# Patient Record
Sex: FEMALE | Race: WHITE | HISPANIC OR LATINO
[De-identification: names, ages, dates, MRNs, and addresses within clinical notes are randomized per-mention and may not be internally consistent; named-entity substitution may affect disease eponyms.]

---

## 2020-09-24 ENCOUNTER — TRANSCRIPTION ENCOUNTER (OUTPATIENT)
Age: 50
End: 2020-09-24

## 2021-11-13 ENCOUNTER — TRANSCRIPTION ENCOUNTER (OUTPATIENT)
Age: 51
End: 2021-11-13

## 2022-09-07 PROBLEM — Z00.00 ENCOUNTER FOR PREVENTIVE HEALTH EXAMINATION: Status: ACTIVE | Noted: 2022-09-07

## 2022-09-08 ENCOUNTER — APPOINTMENT (OUTPATIENT)
Dept: ORTHOPEDIC SURGERY | Facility: CLINIC | Age: 52
End: 2022-09-08

## 2022-09-08 VITALS — BODY MASS INDEX: 35.85 KG/M2 | WEIGHT: 210 LBS | HEIGHT: 64 IN

## 2022-09-08 DIAGNOSIS — F17.200 NICOTINE DEPENDENCE, UNSPECIFIED, UNCOMPLICATED: ICD-10-CM

## 2022-09-08 DIAGNOSIS — M19.90 UNSPECIFIED OSTEOARTHRITIS, UNSPECIFIED SITE: ICD-10-CM

## 2022-09-08 PROCEDURE — 73562 X-RAY EXAM OF KNEE 3: CPT | Mod: 50

## 2022-09-08 PROCEDURE — 20610 DRAIN/INJ JOINT/BURSA W/O US: CPT | Mod: 50

## 2022-09-08 PROCEDURE — 99204 OFFICE O/P NEW MOD 45 MIN: CPT | Mod: 25

## 2022-09-08 NOTE — PROCEDURE
[Large Joint Injection] : Large joint injection [Bilateral] : bilaterally of the [Knee] : knee [Pain] : pain [Inflammation] : inflammation [X-ray evidence of Osteoarthritis on this or prior visit] : x-ray evidence of Osteoarthritis on this or prior visit [Alcohol] : alcohol [Betadine] : betadine [Ethyl Chloride sprayed topically] : ethyl chloride sprayed topically [Sterile technique used] : sterile technique used [___ cc    1%] : Lidocaine ~Vcc of 1%  [___ cc    10mg] : Triamcinolone (Kenalog) ~Vcc of 10 mg  [] : Patient tolerated procedure well [Call if redness, pain or fever occur] : call if redness, pain or fever occur [Apply ice for 15min out of every hour for the next 12-24 hours as tolerated] : apply ice for 15 minutes out of every hour for the next 12-24 hours as tolerated [Patient had decreased mobility in the joint] : patient had decreased mobility in the joint [Risks, benefits, alternatives discussed / Verbal consent obtained] : the risks benefits, and alternatives have been discussed, and verbal consent was obtained

## 2022-10-04 ENCOUNTER — APPOINTMENT (OUTPATIENT)
Dept: ORTHOPEDIC SURGERY | Facility: CLINIC | Age: 52
End: 2022-10-04

## 2022-10-04 PROCEDURE — 99213 OFFICE O/P EST LOW 20 MIN: CPT | Mod: 25

## 2022-10-04 PROCEDURE — 20610 DRAIN/INJ JOINT/BURSA W/O US: CPT | Mod: LT

## 2022-10-10 ENCOUNTER — APPOINTMENT (OUTPATIENT)
Dept: ORTHOPEDIC SURGERY | Facility: CLINIC | Age: 52
End: 2022-10-10
Payer: COMMERCIAL

## 2022-10-10 PROCEDURE — 99024 POSTOP FOLLOW-UP VISIT: CPT | Mod: NC

## 2022-10-10 PROCEDURE — 20610 DRAIN/INJ JOINT/BURSA W/O US: CPT | Mod: NC,50

## 2022-10-10 NOTE — HISTORY OF PRESENT ILLNESS
[Gradual] : gradual [10] : 10 [Localized] : localized [Constant] : constant [Household chores] : household chores [Leisure] : leisure [Work] : work [Nothing helps with pain getting better] : Nothing helps with pain getting better [Standing] : standing [Walking] : walking [Full time] : Work status: full time [2] : 2 [Other:____] : [unfilled] [de-identified] : 52yo female with bilateral knee pain, R>L. Right knee pain has been ongoing for 10+ years, left more recently. Got visco injections on the right about 10 years ago. [] : no [FreeTextEntry1] : Bilateral Knees  [de-identified] : 10 years  [de-identified] : [facility] [de-identified] : 10/04/2022 [de-identified] : Bilateral Knees [de-identified] : Visco3 [TWNoteComboBox1] : 10%

## 2022-10-10 NOTE — IMAGING
[de-identified] : Bilateral knee exam: \par \par General: no distress, no ligamentous laxity\par Skin: no significant pertinent finding\par Inspection: \par    Effusion: (-)\par    Malalignment: (-)\par    Swelling: (-)\par    Quad atrophy: (-)\par    J-sign: (-)\par ROM: \par    0 - 120 degrees of flexion.\par Tenderness: \par    MJLT: (-)\par    LJLT: (+)\par    Medial patellar facet tenderness: -\par    Lateral patellar facet tenderness: -\par    Crepitus: +\par    Patellar grind tenderness: +\par    Patellar tendon: (-)\par Stability: \par    Lachman: (-)\par    Varus/Valgus instability: (-)\par    Posterior drawer: (-)\par    Patellar translation: wnl\par Additional tests: \par    McMurrays test: (-)\par    Patellar apprehension: (-)\par    Patellar tilt: (-)\par    Tight lateral retinaculum: (-)\par Strength: 5/5 Q/H/TA/GS/EHL\par Neuro: In tact to light touch throughout, DTR's wnl\par Vascularity: Extremity warm and well perfused\par Gait: normal.\par

## 2022-10-10 NOTE — DISCUSSION/SUMMARY
[de-identified] : BILATeral viso3 #2 today. \par \par ------------------------------------------------------------------------------------------------------------------------------------------------------\par \par The patient was advised of the diagnosis.  The natural history of the pathology was explained in full. All questions were answered.  The risks and benefits of conservative and interventional treatment alternatives were explained to the patient\par \par \par ------------------------------------------------------------------------------------------------------------------------------------------------------\par \par Large joint injections given: Hyaluronic acid/viscosupplementation to Bilateral knees\par \par Viscosupplementation injection indications at this time include- X-ray evidence of osteoarthritis on this or prior visits, and patient having tried OTC's including aspirin, Ibuprofen, Aleve etc or prescription NSAIDS, and/or exercises at home and/ or physical therapy without satisfactory response.\par \par The risks, benefits, and alternatives to viscosupplementation injection were explained in full to the patient. Risks outlined include but are not limited to infection, sepsis, bleeding, scarring, skin discoloration, temporary increase in pain, syncopal episode, failure to resolve symptoms, allergic reaction, and symptom recurrence.  Patient understood the risks. All questions were answered. After discussion of options, the patient requested viscosupplementation. \par \par An injection of Hyaluronic acid of appropriate formulation was injected into the joint(s) after verbal consent, using sterile technique. The patient tolerated the procedure well and there were no complications.  Instructed patient to apply ice to the injection site. Signs and symptoms of infection reviewed and patient advised to call immediately for redness, fevers, and/or chills.\par \par

## 2022-10-18 ENCOUNTER — APPOINTMENT (OUTPATIENT)
Dept: ORTHOPEDIC SURGERY | Facility: CLINIC | Age: 52
End: 2022-10-18
Payer: COMMERCIAL

## 2022-10-18 VITALS — HEIGHT: 64 IN | WEIGHT: 210 LBS | BODY MASS INDEX: 35.85 KG/M2

## 2022-10-18 DIAGNOSIS — M17.12 UNILATERAL PRIMARY OSTEOARTHRITIS, LEFT KNEE: ICD-10-CM

## 2022-10-18 PROCEDURE — 99024 POSTOP FOLLOW-UP VISIT: CPT

## 2022-10-18 PROCEDURE — 20610 DRAIN/INJ JOINT/BURSA W/O US: CPT | Mod: NC

## 2022-10-18 NOTE — IMAGING
[de-identified] : Bilateral knee exam: \par \par General: no distress, no ligamentous laxity\par Skin: no significant pertinent finding\par Inspection: \par    Effusion: (-)\par    Malalignment: (-)\par    Swelling: (-)\par    Quad atrophy: (-)\par    J-sign: (-)\par ROM: \par    0 - 120 degrees of flexion.\par Tenderness: \par    MJLT: (-)\par    LJLT: (+)\par    Medial patellar facet tenderness: -\par    Lateral patellar facet tenderness: -\par    Crepitus: +\par    Patellar grind tenderness: +\par    Patellar tendon: (-)\par Stability: \par    Lachman: (-)\par    Varus/Valgus instability: (-)\par    Posterior drawer: (-)\par    Patellar translation: wnl\par Additional tests: \par    McMurrays test: (-)\par    Patellar apprehension: (-)\par    Patellar tilt: (-)\par    Tight lateral retinaculum: (-)\par Strength: 5/5 Q/H/TA/GS/EHL\par Neuro: In tact to light touch throughout, DTR's wnl\par Vascularity: Extremity warm and well perfused\par Gait: normal.\par

## 2022-10-18 NOTE — DISCUSSION/SUMMARY
[de-identified] : BILATeral viso3 #3 today. \par \par ------------------------------------------------------------------------------------------------------------------------------------------------------\par \par The patient was advised of the diagnosis.  The natural history of the pathology was explained in full. All questions were answered.  The risks and benefits of conservative and interventional treatment alternatives were explained to the patient\par \par \par ------------------------------------------------------------------------------------------------------------------------------------------------------\par \par Large joint injections given: Hyaluronic acid/viscosupplementation to Bilateral knees\par \par Viscosupplementation injection indications at this time include- X-ray evidence of osteoarthritis on this or prior visits, and patient having tried OTC's including aspirin, Ibuprofen, Aleve etc or prescription NSAIDS, and/or exercises at home and/ or physical therapy without satisfactory response.\par \par The risks, benefits, and alternatives to viscosupplementation injection were explained in full to the patient. Risks outlined include but are not limited to infection, sepsis, bleeding, scarring, skin discoloration, temporary increase in pain, syncopal episode, failure to resolve symptoms, allergic reaction, and symptom recurrence.  Patient understood the risks. All questions were answered. After discussion of options, the patient requested viscosupplementation. \par \par An injection of Hyaluronic acid of appropriate formulation was injected into the joint(s) after verbal consent, using sterile technique. The patient tolerated the procedure well and there were no complications.  Instructed patient to apply ice to the injection site. Signs and symptoms of infection reviewed and patient advised to call immediately for redness, fevers, and/or chills.\par \par

## 2022-10-18 NOTE — HISTORY OF PRESENT ILLNESS
[Gradual] : gradual [10] : 10 [2] : 2 [Localized] : localized [Constant] : constant [Household chores] : household chores [Leisure] : leisure [Work] : work [Nothing helps with pain getting better] : Nothing helps with pain getting better [Standing] : standing [Walking] : walking [Full time] : Work status: full time [3] : 3 [Other:____] : [unfilled] [de-identified] : 50yo female with bilateral knee pain, R>L. Right knee pain has been ongoing for 10+ years, left more recently. Got visco injections on the right about 10 years ago. [] : no [FreeTextEntry1] : Bilateral Knees  [de-identified] : 10 years  [de-identified] : [facility] [de-identified] : 10/10/2022 [de-identified] : Bilateral Knees [de-identified] : Visco3 [TWNoteComboBox1] : 10%

## 2022-10-25 NOTE — HISTORY OF PRESENT ILLNESS
[Gradual] : gradual [10] : 10 [5] : 5 [Localized] : localized [Constant] : constant [Household chores] : household chores [Leisure] : leisure [Work] : work [Nothing helps with pain getting better] : Nothing helps with pain getting better [Standing] : standing [Walking] : walking [] : no [FreeTextEntry1] : Bilateral Knees  [de-identified] : 10 years  [de-identified] : [facility]

## 2022-10-25 NOTE — REASON FOR VISIT
[FreeTextEntry2] : 50yo female with bilateral knee pain, R>L. Right knee pain has been ongoing for 10+ years, left more recently. Got visco injections on the right about 10 years ago.

## 2022-10-25 NOTE — DISCUSSION/SUMMARY
[de-identified] : Cortisone injections bilateral knees today tolerated well. Will put in for visco-3. \par \par Entered by SEN Lemus acting as scribe.\par \par -\par The documentation recorded by the scribe accurately reflects the service I personally performed and the decisions made by me.\par

## 2022-10-25 NOTE — IMAGING
[de-identified] : Bilateral knee exam: \par \par General: no distress, no ligamentous laxity\par Skin: no significant pertinent finding\par Inspection: \par    Effusion: (-)\par    Malalignment: (-)\par    Swelling: (-)\par    Quad atrophy: (-)\par    J-sign: (-)\par ROM: \par    0 - 120 degrees of flexion.\par Tenderness: \par    MJLT: (-)\par    LJLT: (+)\par    Medial patellar facet tenderness: -\par    Lateral patellar facet tenderness: -\par    Crepitus: +\par    Patellar grind tenderness: +\par    Patellar tendon: (-)\par Stability: \par    Lachman: (-)\par    Varus/Valgus instability: (-)\par    Posterior drawer: (-)\par    Patellar translation: wnl\par Additional tests: \par    McMurrays test: (-)\par    Patellar apprehension: (-)\par    Patellar tilt: (-)\par    Tight lateral retinaculum: (-)\par Strength: 5/5 Q/H/TA/GS/EHL\par Neuro: In tact to light touch throughout, DTR's wnl\par Vascularity: Extremity warm and well perfused\par Gait: normal.\par  [Bilateral] : knee bilaterally [All Views] : anteroposterior, lateral, skyline, and anteroposterior standing [Degenerative change] : Degenerative change

## 2022-12-04 NOTE — HISTORY OF PRESENT ILLNESS
[Gradual] : gradual [10] : 10 [2] : 2 [Localized] : localized [Constant] : constant [Household chores] : household chores [Leisure] : leisure [Work] : work [Nothing helps with pain getting better] : Nothing helps with pain getting better [Standing] : standing [Walking] : walking [Full time] : Work status: full time [1] : 1 [de-identified] : 52yo female with bilateral knee pain, R>L. Right knee pain has been ongoing for 10+ years, left more recently. Got visco injections on the right about 10 years ago. [] : This patient has had an injection before: no [FreeTextEntry1] : Bilateral Knees  [de-identified] : 10 years  [de-identified] : [facility]

## 2022-12-04 NOTE — DISCUSSION/SUMMARY
[de-identified] : Cortisone injections bilateral knees today tolerated well. Will put in for visco-3. \par \par Entered by SEN Lemus acting as scribe.\par \par -\par The documentation recorded by the scribe accurately reflects the service I personally performed and the decisions made by me.\par

## 2022-12-04 NOTE — IMAGING
[de-identified] : Bilateral knee exam: \par \par General: no distress, no ligamentous laxity\par Skin: no significant pertinent finding\par Inspection: \par    Effusion: (-)\par    Malalignment: (-)\par    Swelling: (-)\par    Quad atrophy: (-)\par    J-sign: (-)\par ROM: \par    0 - 120 degrees of flexion.\par Tenderness: \par    MJLT: (-)\par    LJLT: (+)\par    Medial patellar facet tenderness: -\par    Lateral patellar facet tenderness: -\par    Crepitus: +\par    Patellar grind tenderness: +\par    Patellar tendon: (-)\par Stability: \par    Lachman: (-)\par    Varus/Valgus instability: (-)\par    Posterior drawer: (-)\par    Patellar translation: wnl\par Additional tests: \par    McMurrays test: (-)\par    Patellar apprehension: (-)\par    Patellar tilt: (-)\par    Tight lateral retinaculum: (-)\par Strength: 5/5 Q/H/TA/GS/EHL\par Neuro: In tact to light touch throughout, DTR's wnl\par Vascularity: Extremity warm and well perfused\par Gait: normal.\par

## 2023-04-14 ENCOUNTER — APPOINTMENT (OUTPATIENT)
Dept: ORTHOPEDIC SURGERY | Facility: CLINIC | Age: 53
End: 2023-04-14
Payer: MEDICAID

## 2023-04-14 PROCEDURE — 99214 OFFICE O/P EST MOD 30 MIN: CPT | Mod: 25

## 2023-04-14 PROCEDURE — 73564 X-RAY EXAM KNEE 4 OR MORE: CPT | Mod: RT

## 2023-04-14 PROCEDURE — 20610 DRAIN/INJ JOINT/BURSA W/O US: CPT | Mod: LT

## 2023-04-14 RX ORDER — MELOXICAM 15 MG/1
15 TABLET ORAL
Qty: 30 | Refills: 1 | Status: ACTIVE | COMMUNITY
Start: 2023-04-14 | End: 1900-01-01

## 2023-04-14 NOTE — HISTORY OF PRESENT ILLNESS
[de-identified] : ROULA DE LA O  is an 52 year-year-old female presents today with a chief complaint of lateral right greater than left knee pain. Patient describes onset over the last number of months, but it may be going on for years.  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  \par \par Patient points to the medial aspect of knee as maximum point of tenderness. Pain is typically 8-10/10 in severity, dull and achy but sometimes sharp in quality with certain activities. Symptoms are exacerbated by activity, or even walking/standing. They are improved with rest, and ice. Patient denies any radiation of symptoms beyond the surrounding area. Hip and ankle appear to be largely unrelated. \par \par To address the pathology, they have tried OTC medications/NSAIDs with some relief, even though short lasting. Injections have been attempted. Exercise therapy has had only temporary relief but has helped maintain greater than 50 % range of motion. Things have gotten bad enough that patient will need assistive devices like the banister for going up and down stairs. They may need a cane. \par \par At this point the patient is quite frustrated by their condition. As duration of symptoms exceeds [], they are here for further evaluation and discussion of possible diagnoses and management. They deny any further trauma. No fever or chills, no signs of infection. Today, patient does not state any other associated signs or complains outside of those described. \par \par A complete review of symptoms as well as past medical/surgical history, medications, allergies, social and family history, and other details of HPI and exam were reviewed per first visit intake form and updated accordingly. Additional and more relevant details are noted in further detail today.\par

## 2023-04-14 NOTE — PHYSICAL EXAM
[de-identified] : General Appearance / Station: Well developed, well nourished, in no acute distress \par Orientation: Oriented to person, place, and time\par Gait & Station: Ambulates without assistive device\par Neurologic: Normal leg sensation \par Cardiovascular: Warm extremity \par Lymphatics: No lymphedema \par Generalized Ligament Laxity: Normal \par Stiffness: Normal \par \par LUMBAR SPINE:\par Nontender  at lumbar spine\par Straight leg raise: Negative \par Motor: 5/5 motor L2-S1\par Sensation: Intact  L2-S1\par \par RIGHT HIP:\par Range of motion: Painless  internal and external rotation of the hip.\par Strength: Within Normal Limits \par Palpation: Nontender  at greater trochanter. Nontender  at SI joint\par Stinchfield: Negative \par FADIR: Negative \par JOSUE: Negative \par \par SYMPTOMATIC RIGHT KNEE:\par Alignment: VARUS \par Skin: normal\par Effusion: none .\par Quadriceps: normal .\par Range of motion: symmetrical but painful .\par PF crepitus: 1+.\par PF apprehension: none .\par Patella / Patella Tendon: nontender .\par Lachman's: negative \par Valgus @ 30°: negative.\par Varus @ 30°: negative.\par Posterior drawer: negative.\par Palpation: TENDER AT MEDIAL AND LATERAL JOINT LINE.\par Meniscus signs: MEDIAL JOINT LINE\par \par SYMPTOMATIC LEFT KNEE:\par Alignment: VARUS \par Skin: normal\par Effusion: none .\par Quadriceps: normal .\par Range of motion: symmetrical but painful .\par PF crepitus: 1+.\par PF apprehension: none .\par Patella / Patella Tendon: nontender .\par Lachman's: negative \par Valgus @ 30°: negative.\par Varus @ 30°: negative.\par Posterior drawer: negative.\par Palpation: TENDER AT MEDIAL AND LATERAL JOINT LINE.\par Meniscus signs: MEDIAL JOINT LINE\par  [de-identified] : Imaging: Standing 4 views of the left knee show left knee moderate arthritis worse in the medial compartment with loss of joint space, subchondral sclerosis, marginal osteophyte formations, and subchondral cyst formation. There are no signs of fracture. There is no  knee effusion. The soft tissues appear unremarkable\par \par Imaging: Standing 4 views of the right knee show right knee severe arthritis worse in the medial compartment with loss of joint space, subchondral sclerosis, marginal osteophyte formations, and subchondral cyst formation. There are no signs of fracture. There is no  knee effusion. The soft tissues appear unremarkable\par

## 2023-04-14 NOTE — PROCEDURE
[de-identified] : Injection: Right knee joint. Indication: Osteoarthritis.  \par A discussion was had with the patient regarding this procedure and all questions were answered. All risks, benefits and alternatives were discussed. These included but were not limited to bleeding, infection, allergic reaction and reaccumulation of fluid. A timeout was done to ensure correct side and patient agreed to the procedure.  A [place] suyapa was created on the skin utilizing a plastic needle cap to suyapa the anticipated point of entry. Alcohol was used to clean the skin, and chloraprep was used to sterilize and prep the area in the lateral joint line aspect of the knee. Ethyl chloride spray was then used as a topical anesthetic. A 22-gauge needle was used to inject 4cc 1% lidocaine without epinephrine and 1cc of 40mg/ml methylprednisolone into the knee. A sterile bandage was then applied. The patient tolerated the procedure well. \par \par Injection: Left knee joint. Indication: Osteoarthritis.  \par A discussion was had with the patient regarding this procedure and all questions were answered. All risks, benefits and alternatives were discussed. These included but were not limited to bleeding, infection, allergic reaction and reaccumulation of fluid. A timeout was done to ensure correct side and patient agreed to the procedure.  A [place] suyapa was created on the skin utilizing a plastic needle cap to suyapa the anticipated point of entry. Alcohol was used to clean the skin, and chloraprep was used to sterilize and prep the area in the lateral joint line aspect of the knee. Ethyl chloride spray was then used as a topical anesthetic. A 22-gauge needle was used to inject 4cc 1% lidocaine without epinephrine and 1cc of 40mg/ml methylprednisolone into the knee. A sterile bandage was then applied. The patient tolerated the procedure well.\par

## 2023-04-14 NOTE — DISCUSSION/SUMMARY
[Medication Risks Reviewed] : Medication risks reviewed [de-identified] : I discussed nonoperative and operative treatment options for their arthritis. We discussed nonoperative treatments options including activity modification, therapy, gait aides, nonsteroidal anti-inflammatory medications, and injections. The patient would like to continue with nonoperative treatment of their arthritis and would like to proceed with activity modification and weight loss, physical therapy, steroid injection.\par  \par I discussed with them that I often prescribe an anti-inflammatory that should be taken once a day with meals to decrease pain and expedite symptom relief. They should not take this while also taking Aleve (Naprosyn), Motrin/ Advil (Ibuprofen), Toradol (ketoralac). They must stop taking it if they develops stomach pain, increased bleeding or bruising and they should follow-up with their primary care doctor for routine blood work including kidney function to monitor its effect. While it Is not a habit-forming substance, it should only  be taken as needed and to discontinue use once symptoms have resolved.\par \par We discussed that when nonoperative treatment is no longer successful and their pain is severe enough that it is affecting their ability to perform activities of daily living, a joint replacement may help them.\par \par My cumulative time spent on this patient’s visit included: Preparation for the visit, review of the medical records, review of pertinent diagnostic studies, examination and counseling of the patient on the above diagnosis, treatment plan and prognosis, orders of diagnostic tests, medications and/or appropriate procedures and documentation in the medical records of today’s visit.\par \par

## 2023-06-30 ENCOUNTER — APPOINTMENT (OUTPATIENT)
Dept: ORTHOPEDIC SURGERY | Facility: CLINIC | Age: 53
End: 2023-06-30
Payer: MEDICAID

## 2023-06-30 PROCEDURE — 99214 OFFICE O/P EST MOD 30 MIN: CPT | Mod: 25

## 2023-06-30 PROCEDURE — 20610 DRAIN/INJ JOINT/BURSA W/O US: CPT | Mod: RT

## 2023-06-30 RX ORDER — IBUPROFEN 800 MG/1
800 TABLET, FILM COATED ORAL
Qty: 30 | Refills: 1 | Status: ACTIVE | COMMUNITY
Start: 2023-06-30 | End: 1900-01-01

## 2023-06-30 NOTE — HISTORY OF PRESENT ILLNESS
[de-identified] : ROULA DE LA O  is an 52 year female  presents today for follow-up of bilateral right greater than left knee pain. At our last visit they had injections for their knee arthritis which helped for about 2-1/2 months.  They have been taking ibuprofen once daily as needed for pain which is helping.  They are working on weight loss.. \par \par No fever, chills, or signs of infection. Today, patient does not state any other associated signs or complaints outside of those described. \par \par A complete review of symptoms as well as past medical/surgical history, medications, allergies, social and family history, and other details of HPI and exam were reviewed per first visit intake form and updated accordingly. Additional and more relevant details are noted in further detail today.\par

## 2023-06-30 NOTE — PHYSICAL EXAM
[de-identified] : General Appearance / Station: Well developed, well nourished, in no acute distress \par Orientation: Oriented to person, place, and time\par Gait & Station: Ambulates without assistive device\par Neurologic: Normal leg sensation \par Cardiovascular: Warm extremity \par Lymphatics: No lymphedema \par Generalized Ligament Laxity: Normal \par Stiffness: Normal \par \par LUMBAR SPINE:\par Nontender  at lumbar spine\par Straight leg raise: Negative \par Motor: 5/5 motor L2-S1\par Sensation: Intact  L2-S1\par \par RIGHT HIP:\par Range of motion: Painless  internal and external rotation of the hip.\par Strength: Within Normal Limits \par Palpation: Nontender  at greater trochanter. Nontender  at SI joint\par Stinchfield: Negative \par FADIR: Negative \par JOSUE: Negative \par \par SYMPTOMATIC RIGHT KNEE:\par Alignment: VARUS \par Skin: normal\par Effusion: none .\par Quadriceps: normal .\par Range of motion: symmetrical but painful .\par PF crepitus: 1+.\par PF apprehension: none .\par Patella / Patella Tendon: nontender .\par Lachman's: negative \par Valgus @ 30°: negative.\par Varus @ 30°: negative.\par Posterior drawer: negative.\par Palpation: TENDER AT MEDIAL AND LATERAL JOINT LINE.\par Meniscus signs: MEDIAL JOINT LINE\par \par SYMPTOMATIC LEFT KNEE:\par Alignment: VARUS \par Skin: normal\par Effusion: none .\par Quadriceps: normal .\par Range of motion: symmetrical but painful .\par PF crepitus: 1+.\par PF apprehension: none .\par Patella / Patella Tendon: nontender .\par Lachman's: negative \par Valgus @ 30°: negative.\par Varus @ 30°: negative.\par Posterior drawer: negative.\par Palpation: TENDER AT MEDIAL AND LATERAL JOINT LINE.\par Meniscus signs: MEDIAL JOINT LINE\par

## 2023-06-30 NOTE — PROCEDURE
[de-identified] : Injection: Right knee joint. Indication: Osteoarthritis.  \par A discussion was had with the patient regarding this procedure and all questions were answered. All risks, benefits and alternatives were discussed. These included but were not limited to bleeding, infection, allergic reaction and reaccumulation of fluid. A timeout was done to ensure correct side and patient agreed to the procedure.  A [place] suyapa was created on the skin utilizing a plastic needle cap to suyapa the anticipated point of entry. Alcohol was used to clean the skin, and chloraprep was used to sterilize and prep the area in the lateral joint line aspect of the knee. Ethyl chloride spray was then used as a topical anesthetic. A 22-gauge needle was used to inject 4cc 1% lidocaine without epinephrine and 1cc of 40mg/ml methylprednisolone into the knee. A sterile bandage was then applied. The patient tolerated the procedure well. \par \par Injection: Left knee joint. Indication: Osteoarthritis.  \par A discussion was had with the patient regarding this procedure and all questions were answered. All risks, benefits and alternatives were discussed. These included but were not limited to bleeding, infection, allergic reaction and reaccumulation of fluid. A timeout was done to ensure correct side and patient agreed to the procedure.  A [place] suyapa was created on the skin utilizing a plastic needle cap to suyapa the anticipated point of entry. Alcohol was used to clean the skin, and chloraprep was used to sterilize and prep the area in the lateral joint line aspect of the knee. Ethyl chloride spray was then used as a topical anesthetic. A 22-gauge needle was used to inject 4cc 1% lidocaine without epinephrine and 1cc of 40mg/ml methylprednisolone into the knee. A sterile bandage was then applied. The patient tolerated the procedure well.\par

## 2023-06-30 NOTE — DISCUSSION/SUMMARY
[de-identified] : I discussed nonoperative treatment options for their bilateral knee osteoarthritis.. We discussed nonoperative treatments options including activity modification, therapy, bracing, nonsteroidal anti-inflammatory medications, and injections.  We discussed bracing for medial compartment arthritis and she was given bilateral injections as well.\par \par I discussed with them that I often prescribe an anti-inflammatory that should be taken once a day with meals to decrease pain and expedite symptom relief. They should not take this while also taking Aleve (Naprosyn), Motrin/ Advil (Ibuprofen), Toradol (ketoralac). They must stop taking it if they develops stomach pain, increased bleeding or bruising and they should follow-up with their primary care doctor for routine blood work including kidney function to monitor its effect. While it Is not a habit-forming substance, it should only  be taken as needed and to discontinue use once symptoms have resolved.  Plan to them that they need to see their primary care doctor for routine blood work.\par \par We discussed that when nonoperative treatment is no longer successful and their pain is severe enough that it is affecting their ability to perform activities of daily living, a joint replacement may help them.\par \par My cumulative time spent on this patient’s visit included: Preparation for the visit, review of the medical records, review of pertinent diagnostic studies, examination and counseling of the patient on the above diagnosis, treatment plan and prognosis, orders of diagnostic tests, medications and/or appropriate procedures and documentation in the medical records of today’s visit.\par \par

## 2023-10-13 ENCOUNTER — APPOINTMENT (OUTPATIENT)
Dept: ORTHOPEDIC SURGERY | Facility: CLINIC | Age: 53
End: 2023-10-13
Payer: MEDICAID

## 2023-10-13 VITALS
SYSTOLIC BLOOD PRESSURE: 144 MMHG | BODY MASS INDEX: 37.56 KG/M2 | WEIGHT: 220 LBS | HEIGHT: 64 IN | DIASTOLIC BLOOD PRESSURE: 82 MMHG | HEART RATE: 85 BPM

## 2023-10-13 VITALS
DIASTOLIC BLOOD PRESSURE: 82 MMHG | HEART RATE: 85 BPM | HEIGHT: 64 IN | SYSTOLIC BLOOD PRESSURE: 144 MMHG | WEIGHT: 220 LBS | BODY MASS INDEX: 37.56 KG/M2

## 2023-10-13 PROCEDURE — 73564 X-RAY EXAM KNEE 4 OR MORE: CPT | Mod: LT,RT

## 2023-10-13 PROCEDURE — 99215 OFFICE O/P EST HI 40 MIN: CPT | Mod: 25

## 2023-10-13 PROCEDURE — 20610 DRAIN/INJ JOINT/BURSA W/O US: CPT | Mod: 50

## 2023-10-13 RX ORDER — LIDOCAINE HYDROCHLORIDE 10 MG/ML
1 INJECTION, SOLUTION INFILTRATION; PERINEURAL
Refills: 0 | Status: COMPLETED | OUTPATIENT
Start: 2023-10-13

## 2023-10-13 RX ORDER — IBUPROFEN 800 MG/1
800 TABLET, FILM COATED ORAL
Qty: 30 | Refills: 1 | Status: ACTIVE | COMMUNITY
Start: 2023-10-13 | End: 1900-01-01

## 2023-10-13 RX ORDER — METHYLPRED ACET/NACL,ISO-OS/PF 40 MG/ML
40 VIAL (ML) INJECTION
Qty: 1 | Refills: 0 | Status: COMPLETED | OUTPATIENT
Start: 2023-10-13

## 2023-10-13 RX ORDER — LIDOCAINE HYDROCHLORIDE 10 MG/ML
1 INJECTION, SOLUTION INFILTRATION; PERINEURAL
Qty: 0 | Refills: 0 | Status: COMPLETED | OUTPATIENT
Start: 2023-10-13

## 2023-10-13 RX ADMIN — LIDOCAINE HYDROCHLORIDE 4 %: 10 INJECTION, SOLUTION EPIDURAL; INFILTRATION; INTRACAUDAL; PERINEURAL at 00:00

## 2023-10-13 RX ADMIN — METHYLPREDNISOLONE ACETATE 1 MG/ML: 40 INJECTION, SUSPENSION INTRA-ARTICULAR; INTRALESIONAL; INTRAMUSCULAR; SOFT TISSUE at 00:00

## 2023-11-28 ENCOUNTER — APPOINTMENT (OUTPATIENT)
Dept: CT IMAGING | Facility: CLINIC | Age: 53
End: 2023-11-28
Payer: MEDICAID

## 2023-11-28 PROCEDURE — 73700 CT LOWER EXTREMITY W/O DYE: CPT | Mod: RT

## 2023-12-10 ENCOUNTER — NON-APPOINTMENT (OUTPATIENT)
Age: 53
End: 2023-12-10

## 2024-01-17 ENCOUNTER — NON-APPOINTMENT (OUTPATIENT)
Age: 54
End: 2024-01-17

## 2024-01-19 ENCOUNTER — OUTPATIENT (OUTPATIENT)
Dept: OUTPATIENT SERVICES | Facility: HOSPITAL | Age: 54
LOS: 1 days | End: 2024-01-19
Payer: MEDICAID

## 2024-01-19 VITALS
DIASTOLIC BLOOD PRESSURE: 77 MMHG | SYSTOLIC BLOOD PRESSURE: 120 MMHG | OXYGEN SATURATION: 98 % | HEART RATE: 75 BPM | HEIGHT: 63 IN | RESPIRATION RATE: 18 BRPM | WEIGHT: 216.05 LBS | TEMPERATURE: 98 F

## 2024-01-19 DIAGNOSIS — Z01.818 ENCOUNTER FOR OTHER PREPROCEDURAL EXAMINATION: ICD-10-CM

## 2024-01-19 DIAGNOSIS — M17.11 UNILATERAL PRIMARY OSTEOARTHRITIS, RIGHT KNEE: ICD-10-CM

## 2024-01-19 DIAGNOSIS — Z87.59 PERSONAL HISTORY OF OTHER COMPLICATIONS OF PREGNANCY, CHILDBIRTH AND THE PUERPERIUM: Chronic | ICD-10-CM

## 2024-01-19 LAB
ALBUMIN SERPL ELPH-MCNC: 4.1 G/DL — SIGNIFICANT CHANGE UP (ref 3.3–5)
ALP SERPL-CCNC: 94 U/L — SIGNIFICANT CHANGE UP (ref 30–120)
ALT FLD-CCNC: 25 U/L — SIGNIFICANT CHANGE UP (ref 10–60)
ANION GAP SERPL CALC-SCNC: 7 MMOL/L — SIGNIFICANT CHANGE UP (ref 5–17)
APTT BLD: 35.2 SEC — SIGNIFICANT CHANGE UP (ref 24.5–35.6)
AST SERPL-CCNC: 15 U/L — SIGNIFICANT CHANGE UP (ref 10–40)
BILIRUB SERPL-MCNC: 0.3 MG/DL — SIGNIFICANT CHANGE UP (ref 0.2–1.2)
BLD GP AB SCN SERPL QL: SIGNIFICANT CHANGE UP
BUN SERPL-MCNC: 29 MG/DL — HIGH (ref 7–23)
CALCIUM SERPL-MCNC: 9 MG/DL — SIGNIFICANT CHANGE UP (ref 8.4–10.5)
CHLORIDE SERPL-SCNC: 102 MMOL/L — SIGNIFICANT CHANGE UP (ref 96–108)
CO2 SERPL-SCNC: 29 MMOL/L — SIGNIFICANT CHANGE UP (ref 22–31)
CREAT SERPL-MCNC: 0.73 MG/DL — SIGNIFICANT CHANGE UP (ref 0.5–1.3)
EGFR: 99 ML/MIN/1.73M2 — SIGNIFICANT CHANGE UP
GLUCOSE SERPL-MCNC: 108 MG/DL — HIGH (ref 70–99)
HCT VFR BLD CALC: 43.6 % — SIGNIFICANT CHANGE UP (ref 34.5–45)
HGB BLD-MCNC: 13.9 G/DL — SIGNIFICANT CHANGE UP (ref 11.5–15.5)
INR BLD: 1.1 RATIO — SIGNIFICANT CHANGE UP (ref 0.85–1.18)
MCHC RBC-ENTMCNC: 27.6 PG — SIGNIFICANT CHANGE UP (ref 27–34)
MCHC RBC-ENTMCNC: 31.9 GM/DL — LOW (ref 32–36)
MCV RBC AUTO: 86.5 FL — SIGNIFICANT CHANGE UP (ref 80–100)
MRSA PCR RESULT.: SIGNIFICANT CHANGE UP
NRBC # BLD: 0 /100 WBCS — SIGNIFICANT CHANGE UP (ref 0–0)
PLATELET # BLD AUTO: 272 K/UL — SIGNIFICANT CHANGE UP (ref 150–400)
POTASSIUM SERPL-MCNC: 3.9 MMOL/L — SIGNIFICANT CHANGE UP (ref 3.5–5.3)
POTASSIUM SERPL-SCNC: 3.9 MMOL/L — SIGNIFICANT CHANGE UP (ref 3.5–5.3)
PROT SERPL-MCNC: 7.6 G/DL — SIGNIFICANT CHANGE UP (ref 6–8.3)
PROTHROM AB SERPL-ACNC: 12 SEC — SIGNIFICANT CHANGE UP (ref 9.5–13)
RBC # BLD: 5.04 M/UL — SIGNIFICANT CHANGE UP (ref 3.8–5.2)
RBC # FLD: 13.7 % — SIGNIFICANT CHANGE UP (ref 10.3–14.5)
S AUREUS DNA NOSE QL NAA+PROBE: SIGNIFICANT CHANGE UP
SODIUM SERPL-SCNC: 138 MMOL/L — SIGNIFICANT CHANGE UP (ref 135–145)
WBC # BLD: 9.1 K/UL — SIGNIFICANT CHANGE UP (ref 3.8–10.5)
WBC # FLD AUTO: 9.1 K/UL — SIGNIFICANT CHANGE UP (ref 3.8–10.5)

## 2024-01-19 PROCEDURE — 93010 ELECTROCARDIOGRAM REPORT: CPT

## 2024-01-19 PROCEDURE — 93005 ELECTROCARDIOGRAM TRACING: CPT

## 2024-01-19 PROCEDURE — G0463: CPT

## 2024-01-19 RX ORDER — IBUPROFEN 200 MG
1 TABLET ORAL
Refills: 0 | DISCHARGE

## 2024-01-19 NOTE — H&P PST ADULT - HISTORY OF PRESENT ILLNESS
1 person assist 54 y/o female with several year history  progressively worsening right knee pain. She has had cortisone injections with some temporary relief. She rates the pain 8/ 10 while long standing. Patient is scheduled for Right total knee replacement on 01/31/2024

## 2024-01-19 NOTE — H&P PST ADULT - ATTENDING COMMENTS
The discussion regarding options for nonoperative and operative management was introduced through a patient shared decision making protocol. The benefits of surgery versus the risks were discussed with the patient and family.  Risks of surgery include medical complications of anesthesia, DVT, pulmonary embolism, heart attack, stroke, death, hardware complications, need for revision surgery, infection, bleeding, need for transfusion, neurovascular injury, dislocation, fracture, chronic pain, stiffness and scarring, and limb length discrepancy were addressed with the patient. The patient appeared to understand the ramifications of surgery and had ample time for questions, then consenting for a right total knee replacement.

## 2024-01-19 NOTE — H&P PST ADULT - ASSESSMENT
52 y/o female is scheduled for right total knee replacement  pre-op instructions provided  obtain medical clearance

## 2024-01-19 NOTE — H&P PST ADULT - NEGATIVE PSYCHIATRIC SYMPTOMS
no depression/no anxiety/no memory loss/no mood swings/no visual hallucinations/no auditory hallucinations

## 2024-01-20 LAB
A1C WITH ESTIMATED AVERAGE GLUCOSE RESULT: 5.8 % — HIGH (ref 4–5.6)
ESTIMATED AVERAGE GLUCOSE: 120 MG/DL — HIGH (ref 68–114)

## 2024-01-23 ENCOUNTER — APPOINTMENT (OUTPATIENT)
Dept: INTERNAL MEDICINE | Facility: CLINIC | Age: 54
End: 2024-01-23
Payer: MEDICAID

## 2024-01-23 VITALS
HEART RATE: 87 BPM | HEIGHT: 64 IN | DIASTOLIC BLOOD PRESSURE: 82 MMHG | RESPIRATION RATE: 16 BRPM | SYSTOLIC BLOOD PRESSURE: 122 MMHG | TEMPERATURE: 98.2 F | BODY MASS INDEX: 36.7 KG/M2 | OXYGEN SATURATION: 98 % | WEIGHT: 215 LBS

## 2024-01-23 DIAGNOSIS — M17.11 UNILATERAL PRIMARY OSTEOARTHRITIS, RIGHT KNEE: ICD-10-CM

## 2024-01-23 PROCEDURE — 99214 OFFICE O/P EST MOD 30 MIN: CPT

## 2024-01-23 NOTE — HISTORY OF PRESENT ILLNESS
[No Pertinent Cardiac History] : no history of aortic stenosis, atrial fibrillation, coronary artery disease, recent myocardial infarction, or implantable device/pacemaker [Smoker] : smoker [No Adverse Anesthesia Reaction] : no adverse anesthesia reaction in self or family member [Chronic Anticoagulation] : no chronic anticoagulation [Chronic Kidney Disease] : no chronic kidney disease [Diabetes] : no diabetes [(Patient denies any chest pain, claudication, dyspnea on exertion, orthopnea, palpitations or syncope)] : Patient denies any chest pain, claudication, dyspnea on exertion, orthopnea, palpitations or syncope [FreeTextEntry1] : full right knee replacement [FreeTextEntry2] : 1/31/24 [FreeTextEntry3] : Dr. Walden [FreeTextEntry4] : 53-year-old current smoker one to two cigarettes a day here for clearance for right knee replacement. Patient denies any chest pain no sob no palpitations.

## 2024-01-24 PROBLEM — E66.9 OBESITY, UNSPECIFIED: Chronic | Status: ACTIVE | Noted: 2024-01-19

## 2024-01-24 PROBLEM — L40.9 PSORIASIS, UNSPECIFIED: Chronic | Status: ACTIVE | Noted: 2024-01-19

## 2024-01-24 PROBLEM — M19.90 UNSPECIFIED OSTEOARTHRITIS, UNSPECIFIED SITE: Chronic | Status: ACTIVE | Noted: 2024-01-19

## 2024-01-30 ENCOUNTER — TRANSCRIPTION ENCOUNTER (OUTPATIENT)
Age: 54
End: 2024-01-30

## 2024-01-31 ENCOUNTER — TRANSCRIPTION ENCOUNTER (OUTPATIENT)
Age: 54
End: 2024-01-31

## 2024-01-31 ENCOUNTER — INPATIENT (INPATIENT)
Facility: HOSPITAL | Age: 54
LOS: 0 days | Discharge: HOME | DRG: 470 | End: 2024-02-01
Attending: STUDENT IN AN ORGANIZED HEALTH CARE EDUCATION/TRAINING PROGRAM | Admitting: STUDENT IN AN ORGANIZED HEALTH CARE EDUCATION/TRAINING PROGRAM
Payer: MEDICAID

## 2024-01-31 ENCOUNTER — APPOINTMENT (OUTPATIENT)
Dept: ORTHOPEDIC SURGERY | Facility: HOSPITAL | Age: 54
End: 2024-01-31

## 2024-01-31 VITALS
HEIGHT: 63 IN | TEMPERATURE: 98 F | OXYGEN SATURATION: 100 % | RESPIRATION RATE: 12 BRPM | SYSTOLIC BLOOD PRESSURE: 137 MMHG | DIASTOLIC BLOOD PRESSURE: 78 MMHG | WEIGHT: 211.42 LBS | HEART RATE: 69 BPM

## 2024-01-31 DIAGNOSIS — M17.11 UNILATERAL PRIMARY OSTEOARTHRITIS, RIGHT KNEE: ICD-10-CM

## 2024-01-31 DIAGNOSIS — Z87.59 PERSONAL HISTORY OF OTHER COMPLICATIONS OF PREGNANCY, CHILDBIRTH AND THE PUERPERIUM: Chronic | ICD-10-CM

## 2024-01-31 LAB
ABO RH CONFIRMATION: SIGNIFICANT CHANGE UP
HCG UR QL: NEGATIVE — SIGNIFICANT CHANGE UP

## 2024-01-31 PROCEDURE — 73560 X-RAY EXAM OF KNEE 1 OR 2: CPT | Mod: 26,RT

## 2024-01-31 PROCEDURE — 0055T BONE SRGRY CMPTR CT/MRI IMAG: CPT | Mod: RT

## 2024-01-31 PROCEDURE — 27447 TOTAL KNEE ARTHROPLASTY: CPT | Mod: AS,RT

## 2024-01-31 PROCEDURE — 27447 TOTAL KNEE ARTHROPLASTY: CPT | Mod: RT

## 2024-01-31 PROCEDURE — 99221 1ST HOSP IP/OBS SF/LOW 40: CPT

## 2024-01-31 DEVICE — MAKO BONE PIN 3.2MM X 140MM: Type: IMPLANTABLE DEVICE | Status: FUNCTIONAL

## 2024-01-31 DEVICE — BASEPLATE TIB TRIATHLON TRITAN SZ 4: Type: IMPLANTABLE DEVICE | Status: FUNCTIONAL

## 2024-01-31 DEVICE — MAKO BONE PIN 4MM X 110MM: Type: IMPLANTABLE DEVICE | Status: FUNCTIONAL

## 2024-01-31 DEVICE — PATELLA ASYMM TRIATHLON SZ A 32X10MM: Type: IMPLANTABLE DEVICE | Status: FUNCTIONAL

## 2024-01-31 DEVICE — MAKO BONE PIN 3.2MM X 110MM: Type: IMPLANTABLE DEVICE | Status: FUNCTIONAL

## 2024-01-31 DEVICE — COMP FEM CR CMNTLSS BEADED W/ PA SZ 4 RT: Type: IMPLANTABLE DEVICE | Status: FUNCTIONAL

## 2024-01-31 DEVICE — INSERT TIB BEARING CS X3 SZ 4 9MM: Type: IMPLANTABLE DEVICE | Status: FUNCTIONAL

## 2024-01-31 DEVICE — IMPLANTABLE DEVICE: Type: IMPLANTABLE DEVICE | Status: FUNCTIONAL

## 2024-01-31 DEVICE — MAKO BONE PIN 4MM X 140MM: Type: IMPLANTABLE DEVICE | Status: FUNCTIONAL

## 2024-01-31 RX ORDER — ONDANSETRON 8 MG/1
4 TABLET, FILM COATED ORAL ONCE
Refills: 0 | Status: DISCONTINUED | OUTPATIENT
Start: 2024-01-31 | End: 2024-01-31

## 2024-01-31 RX ORDER — OXYCODONE HYDROCHLORIDE 5 MG/1
5 TABLET ORAL
Refills: 0 | Status: DISCONTINUED | OUTPATIENT
Start: 2024-01-31 | End: 2024-02-01

## 2024-01-31 RX ORDER — SODIUM CHLORIDE 9 MG/ML
500 INJECTION INTRAMUSCULAR; INTRAVENOUS; SUBCUTANEOUS ONCE
Refills: 0 | Status: COMPLETED | OUTPATIENT
Start: 2024-01-31 | End: 2024-01-31

## 2024-01-31 RX ORDER — DEXAMETHASONE 0.5 MG/5ML
8 ELIXIR ORAL ONCE
Refills: 0 | Status: COMPLETED | OUTPATIENT
Start: 2024-02-01 | End: 2024-02-01

## 2024-01-31 RX ORDER — TRANEXAMIC ACID 100 MG/ML
1000 INJECTION, SOLUTION INTRAVENOUS ONCE
Refills: 0 | Status: COMPLETED | OUTPATIENT
Start: 2024-01-31 | End: 2024-01-31

## 2024-01-31 RX ORDER — HYDROMORPHONE HYDROCHLORIDE 2 MG/ML
0.2 INJECTION INTRAMUSCULAR; INTRAVENOUS; SUBCUTANEOUS
Refills: 0 | Status: DISCONTINUED | OUTPATIENT
Start: 2024-01-31 | End: 2024-01-31

## 2024-01-31 RX ORDER — MAGNESIUM HYDROXIDE 400 MG/1
30 TABLET, CHEWABLE ORAL DAILY
Refills: 0 | Status: DISCONTINUED | OUTPATIENT
Start: 2024-01-31 | End: 2024-02-01

## 2024-01-31 RX ORDER — PANTOPRAZOLE SODIUM 20 MG/1
40 TABLET, DELAYED RELEASE ORAL
Refills: 0 | Status: DISCONTINUED | OUTPATIENT
Start: 2024-01-31 | End: 2024-02-01

## 2024-01-31 RX ORDER — HYDROMORPHONE HYDROCHLORIDE 2 MG/ML
0.5 INJECTION INTRAMUSCULAR; INTRAVENOUS; SUBCUTANEOUS
Refills: 0 | Status: DISCONTINUED | OUTPATIENT
Start: 2024-01-31 | End: 2024-02-01

## 2024-01-31 RX ORDER — SENNA PLUS 8.6 MG/1
2 TABLET ORAL AT BEDTIME
Refills: 0 | Status: DISCONTINUED | OUTPATIENT
Start: 2024-01-31 | End: 2024-02-01

## 2024-01-31 RX ORDER — CEFAZOLIN SODIUM 1 G
2000 VIAL (EA) INJECTION ONCE
Refills: 0 | Status: COMPLETED | OUTPATIENT
Start: 2024-01-31 | End: 2024-01-31

## 2024-01-31 RX ORDER — OMEPRAZOLE 10 MG/1
1 CAPSULE, DELAYED RELEASE ORAL
Qty: 30 | Refills: 0
Start: 2024-01-31 | End: 2024-02-29

## 2024-01-31 RX ORDER — CELECOXIB 200 MG/1
200 CAPSULE ORAL EVERY 12 HOURS
Refills: 0 | Status: DISCONTINUED | OUTPATIENT
Start: 2024-01-31 | End: 2024-02-01

## 2024-01-31 RX ORDER — DEXAMETHASONE 0.5 MG/5ML
8 ELIXIR ORAL ONCE
Refills: 0 | Status: DISCONTINUED | OUTPATIENT
Start: 2024-01-31 | End: 2024-01-31

## 2024-01-31 RX ORDER — CHLORHEXIDINE GLUCONATE 213 G/1000ML
1 SOLUTION TOPICAL ONCE
Refills: 0 | Status: COMPLETED | OUTPATIENT
Start: 2024-01-31 | End: 2024-01-31

## 2024-01-31 RX ORDER — OXYCODONE HYDROCHLORIDE 5 MG/1
10 TABLET ORAL
Refills: 0 | Status: DISCONTINUED | OUTPATIENT
Start: 2024-01-31 | End: 2024-02-01

## 2024-01-31 RX ORDER — HYDROMORPHONE HYDROCHLORIDE 2 MG/ML
0.5 INJECTION INTRAMUSCULAR; INTRAVENOUS; SUBCUTANEOUS
Refills: 0 | Status: DISCONTINUED | OUTPATIENT
Start: 2024-01-31 | End: 2024-01-31

## 2024-01-31 RX ORDER — CEFAZOLIN SODIUM 1 G
2000 VIAL (EA) INJECTION EVERY 8 HOURS
Refills: 0 | Status: COMPLETED | OUTPATIENT
Start: 2024-01-31 | End: 2024-01-31

## 2024-01-31 RX ORDER — APREPITANT 80 MG/1
40 CAPSULE ORAL ONCE
Refills: 0 | Status: COMPLETED | OUTPATIENT
Start: 2024-01-31 | End: 2024-01-31

## 2024-01-31 RX ORDER — SODIUM CHLORIDE 9 MG/ML
1000 INJECTION, SOLUTION INTRAVENOUS
Refills: 0 | Status: DISCONTINUED | OUTPATIENT
Start: 2024-01-31 | End: 2024-02-01

## 2024-01-31 RX ORDER — ONDANSETRON 8 MG/1
4 TABLET, FILM COATED ORAL EVERY 6 HOURS
Refills: 0 | Status: DISCONTINUED | OUTPATIENT
Start: 2024-01-31 | End: 2024-02-01

## 2024-01-31 RX ORDER — POLYETHYLENE GLYCOL 3350 17 G/17G
17 POWDER, FOR SOLUTION ORAL AT BEDTIME
Refills: 0 | Status: DISCONTINUED | OUTPATIENT
Start: 2024-01-31 | End: 2024-02-01

## 2024-01-31 RX ORDER — ASPIRIN/CALCIUM CARB/MAGNESIUM 324 MG
81 TABLET ORAL EVERY 12 HOURS
Refills: 0 | Status: DISCONTINUED | OUTPATIENT
Start: 2024-02-01 | End: 2024-02-01

## 2024-01-31 RX ORDER — ACETAMINOPHEN 500 MG
1000 TABLET ORAL EVERY 8 HOURS
Refills: 0 | Status: DISCONTINUED | OUTPATIENT
Start: 2024-01-31 | End: 2024-02-01

## 2024-01-31 RX ORDER — ACETAMINOPHEN 500 MG
1000 TABLET ORAL ONCE
Refills: 0 | Status: COMPLETED | OUTPATIENT
Start: 2024-01-31 | End: 2024-01-31

## 2024-01-31 RX ORDER — SODIUM CHLORIDE 9 MG/ML
1000 INJECTION, SOLUTION INTRAVENOUS
Refills: 0 | Status: DISCONTINUED | OUTPATIENT
Start: 2024-01-31 | End: 2024-01-31

## 2024-01-31 RX ORDER — ASPIRIN/CALCIUM CARB/MAGNESIUM 324 MG
1 TABLET ORAL
Qty: 28 | Refills: 0
Start: 2024-01-31 | End: 2024-02-13

## 2024-01-31 RX ADMIN — SODIUM CHLORIDE 125 MILLILITER(S): 9 INJECTION, SOLUTION INTRAVENOUS at 23:55

## 2024-01-31 RX ADMIN — SODIUM CHLORIDE 125 MILLILITER(S): 9 INJECTION, SOLUTION INTRAVENOUS at 21:28

## 2024-01-31 RX ADMIN — Medication 400 MILLIGRAM(S): at 14:00

## 2024-01-31 RX ADMIN — Medication 100 MILLIGRAM(S): at 15:25

## 2024-01-31 RX ADMIN — OXYCODONE HYDROCHLORIDE 5 MILLIGRAM(S): 5 TABLET ORAL at 18:34

## 2024-01-31 RX ADMIN — SODIUM CHLORIDE 75 MILLILITER(S): 9 INJECTION, SOLUTION INTRAVENOUS at 10:51

## 2024-01-31 RX ADMIN — CHLORHEXIDINE GLUCONATE 1 APPLICATION(S): 213 SOLUTION TOPICAL at 06:40

## 2024-01-31 RX ADMIN — Medication 1000 MILLIGRAM(S): at 21:29

## 2024-01-31 RX ADMIN — Medication 100 MILLIGRAM(S): at 23:54

## 2024-01-31 RX ADMIN — SENNA PLUS 2 TABLET(S): 8.6 TABLET ORAL at 21:29

## 2024-01-31 RX ADMIN — Medication 1000 MILLIGRAM(S): at 21:31

## 2024-01-31 RX ADMIN — OXYCODONE HYDROCHLORIDE 5 MILLIGRAM(S): 5 TABLET ORAL at 19:11

## 2024-01-31 RX ADMIN — Medication 1000 MILLIGRAM(S): at 14:01

## 2024-01-31 RX ADMIN — APREPITANT 40 MILLIGRAM(S): 80 CAPSULE ORAL at 06:40

## 2024-01-31 RX ADMIN — CELECOXIB 200 MILLIGRAM(S): 200 CAPSULE ORAL at 21:31

## 2024-01-31 RX ADMIN — SODIUM CHLORIDE 500 MILLILITER(S): 9 INJECTION INTRAMUSCULAR; INTRAVENOUS; SUBCUTANEOUS at 10:51

## 2024-01-31 RX ADMIN — CELECOXIB 200 MILLIGRAM(S): 200 CAPSULE ORAL at 21:28

## 2024-01-31 RX ADMIN — OXYCODONE HYDROCHLORIDE 5 MILLIGRAM(S): 5 TABLET ORAL at 23:54

## 2024-01-31 RX ADMIN — SODIUM CHLORIDE 500 MILLILITER(S): 9 INJECTION INTRAMUSCULAR; INTRAVENOUS; SUBCUTANEOUS at 12:20

## 2024-01-31 NOTE — BRIEF OPERATIVE NOTE - NSICDXBRIEFPROCEDURE_GEN_ALL_CORE_FT
PROCEDURES:  Arthroplasty, knee, total, robot-assisted, using Thanh system 31-Jan-2024 07:07:53  James Pierson

## 2024-01-31 NOTE — PHYSICAL THERAPY INITIAL EVALUATION ADULT - ADDITIONAL COMMENTS
Pt lives in house with 4 steps to enter with handrail, will stay on first floor. Owns DME-RW. commode. Pt's  significant other will assist at home upon d/c. Has stall shower. + drives. + works.

## 2024-01-31 NOTE — DISCHARGE NOTE PROVIDER - HOSPITAL COURSE
This patient was admitted to [facility] with a history of severe degenerative joint disease of the Right knee.  Patient underwent Pre-Surgical Testing and was medically cleared to undergo elective procedure. Patient underwent Right total knee  by Dr. Walden on XX1/31/23  . Procedure was well tolerated.  No operative or tito-operative complications arose during patient's hospital course.  Patient received antibiotic according to SCIP guidelines for infection prevention.  Aspirin 81mg po bid was given for DVT prophylaxis, in addition to the use of SCDs.  Anesthesia, Medical Hospitalist, Physical Therapy and Occupational Therapy were consulted. Patient is stable for discharge with a good prognosis.  Appropriate discharge instructions and medications are provided in this document. patient was discharged with the appropriate  home PT/OT care This patient was admitted to [facility] with a history of severe degenerative joint disease of the Right knee.  Patient underwent Pre-Surgical Testing and was medically cleared to undergo elective procedure. Patient underwent Right total knee  by Dr. Walden on 1/31/23  . Procedure was well tolerated.  No operative or tito-operative complications arose during patient's hospital course.  Patient received antibiotic according to SCIP guidelines for infection prevention (ancef).  Aspirin 81mg po every 12 hrs was given for DVT prophylaxis, in addition to the use of SCDs.  Anesthesia, Medical Hospitalist, Physical Therapy and Occupational Therapy were consulted. Patient is stable for discharge with a good prognosis.  Appropriate discharge instructions and medications are provided in this document. patient was discharged with the appropriate  home PT/OT care

## 2024-01-31 NOTE — OCCUPATIONAL THERAPY INITIAL EVALUATION ADULT - LIVES WITH, PROFILE
Pt lives alone in a private home, 4-5 steps to enter, 0 steps inside with a walk in shower. Pt reports her boyfriend and mother will be available to assist upon discharge. Pt was independent with ADLs, IADLs, functional mobility/transfers./alone

## 2024-01-31 NOTE — DISCHARGE NOTE NURSING/CASE MANAGEMENT/SOCIAL WORK - NSDCPEEMAIL_GEN_ALL_CORE
[facility] for Tobacco Control email [email or web address]

## 2024-01-31 NOTE — CONSULT NOTE ADULT - SUBJECTIVE AND OBJECTIVE BOX
53F with no known medical history s/p R TKR for OA     The patient was seen postoperative on the medical coto  Reported manageable pain  Tolerating po  Voiding   Partook with initial PT assessment     REVIEW OF SYSTEMS:  CONSTITUTIONAL: No fever, weight loss, or fatigue    EYES: No eye pain, visual disturbances, or discharge    ENMT:  No difficulty hearing, tinnitus, vertigo; No sinus or throat pain    NECK: No pain or stiffness    RESPIRATORY: No cough, wheezing, chills or hemoptysis; No shortness of breath    CARDIOVASCULAR: No chest pain, palpitations, dizziness, or leg swelling    GASTROINTESTINAL: No abdominal or epigastric pain. No nausea, vomiting, or hematemesis; No diarrhea or constipation. No melena or hematochezia.    NEUROLOGICAL: No headaches, memory loss, loss of strength, numbness, or tremors    SKIN: No itching, burning, rashes, or lesions     LYMPH NODES: No enlarged glands    ENDOCRINE: No heat or cold intolerance; No hair loss    PSYCHIATRIC: No depression, anxiety, mood swings, or difficulty sleeping    HEME/LYMPH: No easy bruising, or bleeding gums    ALLERGY AND IMMUNOLOGIC: No hives or eczema        PAST MEDICAL & SURGICAL HISTORY:  Psoriasis      Arthritis      Obesity      H/O           SOCIAL HISTORY:  Residence: [ ] [facility]  [ ] [facility]  [ ] Community  [ ] Substance abuse:   [ ] Tobacco:   [ ] Alcohol use:     Allergies    No Known Allergies    Intolerances        MEDICATIONS  (STANDING):  acetaminophen     Tablet .. 1000 milliGRAM(s) Oral every 8 hours  ceFAZolin   IVPB 2000 milliGRAM(s) IV Intermittent every 8 hours  dexAMETHasone  IVPB 8 milliGRAM(s) IV Intermittent once  lactated ringers. 1000 milliLiter(s) (125 mL/Hr) IV Continuous <Continuous>  pantoprazole    Tablet 40 milliGRAM(s) Oral before breakfast  polyethylene glycol 3350 17 Gram(s) Oral at bedtime  senna 2 Tablet(s) Oral at bedtime    MEDICATIONS  (PRN):  HYDROmorphone  Injectable 0.5 milliGRAM(s) IV Push every 3 hours PRN Breakthrough pain  magnesium hydroxide Suspension 30 milliLiter(s) Oral daily PRN Constipation  ondansetron Injectable 4 milliGRAM(s) IV Push every 6 hours PRN Nausea and/or Vomiting  oxyCODONE    IR 5 milliGRAM(s) Oral every 3 hours PRN Moderate Pain (4 - 6)  oxyCODONE    IR 10 milliGRAM(s) Oral every 3 hours PRN Severe Pain (7 - 10)      FAMILY HISTORY:  No pertinent family history in first degree relatives        Vital Signs Last 24 Hrs  T(C): 36.7 (2024 11:49), Max: 36.7 (2024 06:34)  T(F): 98 (2024 11:49), Max: 98 (2024 06:34)  HR: 64 (2024 11:49) (60 - 69)  BP: 108/65 (2024 11:49) (93/60 - 137/78)  BP(mean): --  RR: 18 (2024 11:49) (12 - 18)  SpO2: 100% (2024 11:49) (98% - 100%)    Parameters below as of 2024 11:25    O2 Flow (L/min): 2      PHYSICAL EXAM:  GENERAL: NAD   HEAD:  Atraumatic, Normocephalic    EYES: EOMI, PERRLA, conjunctiva and sclera clear    NERVOUS SYSTEM:  Alert & Oriented X3, Good concentration; Moving all 4 extremities; No gross sensory deficits    CHEST/LUNG: Clear to auscultation bilaterally; No rales, rhonchi, wheezing, or rubs    HEART: Regular rate and rhythm; No murmurs, rubs, or gallops    ABDOMEN: Soft, Nontender, Nondistended; Bowel sounds present    EXTREMITIES:  2+ Peripheral Pulses, No clubbing, cyanosis, or edema    INCISION R knee dressing clean and dry, neurovascularly intact     LABS:              CAPILLARY BLOOD GLUCOSE          RADIOLOGY & ADDITIONAL STUDIES:    EKG:   Personally Reviewed:  [ ] YES     Imaging:   Personally Reviewed:  [ ] YES     Consultant(s) Notes Reviewed:      Care Discussed with Consultants/Other Providers:

## 2024-01-31 NOTE — DISCHARGE NOTE NURSING/CASE MANAGEMENT/SOCIAL WORK - PATIENT PORTAL LINK FT
You can access the FollowMyHealth Patient Portal offered by [facility] by registering at the following website: [email or web address]. By joining [employer]’s FollowMyHealth portal, you will also be able to view your health information using other applications (apps) compatible with our system.

## 2024-01-31 NOTE — DISCHARGE NOTE PROVIDER - NSDCFUSCHEDAPPT_GEN_ALL_CORE_FT
José Miguel Walden  [facility]  ORTHOSURG [address]  Scheduled Appointment: 01/31/2024    José Miguel Walden  [facility]  ORTHOSURG [address]  Scheduled Appointment: 02/23/2024     José Miguel Walden  [facility]  ORTHOSURG [address]  Scheduled Appointment: 02/23/2024

## 2024-01-31 NOTE — PHYSICAL THERAPY INITIAL EVALUATION ADULT - PERTINENT HX OF CURRENT PROBLEM, REHAB EVAL
52 y/o female with several year history  progressively worsening right knee pain. She has had cortisone injections with some temporary relief. She rates the pain 8/ 10 while long standing. Patient is scheduled for Right total knee replacement on 01/31/2024

## 2024-01-31 NOTE — DISCHARGE NOTE PROVIDER - NSDCCPTREATMENT_GEN_ALL_CORE_FT
PRINCIPAL PROCEDURE  Procedure: Arthroplasty, knee, total, robot-assisted, using Thanh system  Findings and Treatment: right

## 2024-01-31 NOTE — OCCUPATIONAL THERAPY INITIAL EVALUATION ADULT - NSOTDISCHREC_GEN_A_CORE
Supervision/assist with functional activities prn which pt states boyfriend and mother will provide./No skilled OT needs

## 2024-01-31 NOTE — DISCHARGE NOTE NURSING/CASE MANAGEMENT/SOCIAL WORK - NSSCNAMETXT_GEN_ALL_CORE
[facility] care agency [phone] will reach out to you within 24-72 hours of your discharge to schedule home care visit/eval appointment with you. Please call agency for any queries regarding home care services

## 2024-01-31 NOTE — DISCHARGE NOTE NURSING/CASE MANAGEMENT/SOCIAL WORK - NSDCFUADDAPPT_GEN_ALL_CORE_FT
Please follow up w/ Dr Walden at [facility] office  It is advisable to follow up w/ your pcp in 2-3 weeks to be sure there are no underlying problems.

## 2024-01-31 NOTE — DISCHARGE NOTE NURSING/CASE MANAGEMENT/SOCIAL WORK - NSDCPEWEB_GEN_ALL_CORE
[facility] for Tobacco Control website --- [email or web address] website --- [email or web address]

## 2024-01-31 NOTE — DISCHARGE NOTE PROVIDER - NSDCCPCAREPLAN_GEN_ALL_CORE_FT
PRINCIPAL DISCHARGE DIAGNOSIS  Diagnosis: Right knee DJD  Assessment and Plan of Treatment: Physical Therapy/Occupational Therapy for: ambulation, transfers, stairs, ADL's (activities of daily living), range of motion exercises, and isometrics  -Activity  • Weight Bearing as tolerated with rolling walker.  • Take short, frequent walks increasing the distance that you walk each day as tolerated.  • Change your position every hour to decrease pain and stiffness.  • Continue the exercises taught to you by your physical therapist.  • No driving until cleared by the doctor.  • No tub baths, hot tubs, or swimming pools until instructed by your doctor.  • Do not squat down on the floor.  • Do not kneel or twist your knee.  • Range of Motion Goals: Flexion= 120 degrees, Extension = 0 degrees  Keep incision clean and dry. May shower 5 days after surgery if no drainage from incision.       PRINCIPAL DISCHARGE DIAGNOSIS  Diagnosis: Right knee DJD  Assessment and Plan of Treatment: Physical Therapy/Occupational Therapy for: ambulation, transfers, stairs, ADL's (activities of daily living), range of motion exercises, and isometrics  -Activity  • Weight Bearing as tolerated with rolling walker.  • Take short, frequent walks increasing the distance that you walk each day as tolerated.  • Change your position every hour to decrease pain and stiffness.  • Continue the exercises taught to you by your physical therapist.  • No driving until cleared by the doctor.  • No tub baths, hot tubs, or swimming pools until instructed by your doctor.  • Do not squat down on the floor.  • Do not kneel or twist your knee.  • Range of Motion Goals: Flexion= 120 degrees, Extension = 0 degrees  Keep incision clean and dry. You have a mepilex dressing. It is water resistant and may get slightly wet in the shower.  Remove dressing in 7 days and leave wound open to air.  Wound may get wet in the shower as long as there is no drainage from wound. Steristrips will be removed by Dr Walden in office in 2 weeks.  Use cryocuff for 20 minute sessions w/ at least 1 hr between sessions.  Use a towel or washcloth under cuff; don't place directly on the skin.  Opioid safety :  Do not keep opioid in the medicine cabinet in bathroom or on kitchen counter where others may have access to it.  Do not drive while taking opioid.  To dispose of it some pharmacies do have drop boxes as do police stations.  Do not flush or put in trash.

## 2024-01-31 NOTE — DISCHARGE NOTE PROVIDER - NSDCFUADDAPPT_GEN_ALL_CORE_FT
- Call your doctor if you experience:  • An increase in pain not controlled by pain medication or change in activity or  position.  • Temperature greater than 101° F.  • Redness, increased swelling or foul smelling drainage from or around the  incision.  • Numbness, tingling or a change in color or temperature of the operative leg.  • Call your doctor immediately if you experience chest pain, shortness of breath or calf pain.  Please follow up w/ Dr Walden at [facility] office  It is advisable to follow up w/ your pcp in 2-3 weeks to be sure there are no underlying problems.

## 2024-01-31 NOTE — CARE COORDINATION ASSESSMENT. - NSCAREPROVIDERS_GEN_ALL_CORE_FT
CARE PROVIDERS:  Administration: Do Ireland  Administration: Britton Nielsen  Admitting: José Miguel Walden  Attending: José Miguel Walden  Consultant: Eric Lancaster  Covering Team: SAMI Mcclendon  Nurse: Jenifer Orellana  Nurse: Romana Amador  Nurse: Maria Antonia Quigley  Nurse: Romana Bazan  Nurse: Jennifer Connor  Nurse: Naya Pearson  Nurse: Keira Goins  Occupational Therapy: Jayshree Snyder  Override: Maria Antonia Quigley  Override: Naya Pearson  Physical Therapy: Anika Kaur  Physical Therapy: Lisa Claire  Physical Therapy: Renee Ceja  Primary Team: Oh Stephen  Primary Team: Melissa Floyd  Primary Team: Jennifer Novak  Primary Team: James Pierson  Primary Team: Vikki Horton  Respiratory Therapy: Joseph Dalton  Team: MALCOM  Hospitalists, Team  UR// Supp. Assoc.: Felicity Correa

## 2024-01-31 NOTE — DISCHARGE NOTE PROVIDER - NSDCMRMEDTOKEN_GEN_ALL_CORE_FT
Aspirin EC 81 mg oral delayed release tablet: 1 tab(s) orally every 12 hours prevention of  blood clots MDD: 2  cefadroxil 500 mg oral capsule: 1 cap(s) orally every 12 hours MDD: 2  ibuprofen 800 mg oral tablet: 1 tab(s) orally prn  naproxen 250 mg oral tablet: 3 tab(s) orally once a day  omeprazole 20 mg oral delayed release capsule: 1 cap(s) orally once a day MDD: 1   acetaminophen 500 mg oral tablet: 2 tab(s) orally every 8 hours  Aspirin EC 81 mg oral delayed release tablet: 1 tab(s) orally every 12 hours prevention of  blood clots MDD: 2  Aspirin EC 81 mg oral delayed release tablet: 1 tab(s) orally every 12 hours prevention of  blood clots 2 hours prior to celebrex MDD: 2  cefadroxil 500 mg oral capsule: 1 cap(s) orally every 12 hours MDD: 2  CeleBREX 200 mg oral capsule: 1 cap(s) orally every 12 hours 2 hours after aspirin MDD: 2  omeprazole 20 mg oral delayed release capsule: 1 cap(s) orally once a day MDD: 1  oxyCODONE 5 mg oral tablet: 1 tab(s) orally every 4 hours as needed for  pain MDD: 6  senna leaf extract oral tablet: 2 tab(s) orally once a day (at bedtime)

## 2024-01-31 NOTE — CARE COORDINATION ASSESSMENT. - NSDCPLANSERVICES_GEN_ALL_CORE
CM met with patient at bedside.  Patient. A&O x 4. Pt s/p  PROCEDURES: Total right  knee replacement.   Pt  resting comfortably / Pt has no complaints at this time.    CM explained role of CM and availability to assist with discharge planning throughout stay.   Provided CM contact information and pt. verbalized understanding. Patient lives in a private house alone, 4/5 steps to enter. Prior to surgery patient was ind in adls.   Patient identified her boyfriend/mother/sister  as her caregivers at home who will assist her during her recuperation and her boyfriend Bret will transport her home and to MD appointments.     Pt. is agreeable with PT/OT's recommendations for d/c plan to home with HC/PT.  CM explained home care expectations, process, insurance provisions and home safety with good understanding.     Offered list of [facility] and pt. chose [facility] at home care agency.  Provided discharge resources folder. CM made referral accordingly.    Informed them of Anticipated  DC date for 2/1/2024 and for SOC 2/2/2024.    Pt verbalizing understanding and in agreement with d/c plans.  DME: Pt was delivered a rolling walker and commode to  home.  PCP: Dr Joseph Gonzalez [phone]  Pt stated she will be receiving meds to bed from [facility] pharmacy prior to DC./Home Care

## 2024-01-31 NOTE — PATIENT PROFILE ADULT - HAS THE PATIENT RECEIVED THE INFLUENZA VACCINE THIS SEASON?
Intermittent Abd pain x last couple of months.     She mentons her son is incarcerated and attributed it to that and \"nerves.\" Pt agrees to take Omeprazole. Rx sent as instructed. Pt agrees to keep appt tomorrow and will bring in all her medications. She will continue to avoid ASA and NSAIDs.     Awaiting urine culture.   no...

## 2024-01-31 NOTE — OCCUPATIONAL THERAPY INITIAL EVALUATION ADULT - TOILETING, PREVIOUS LEVEL OF FUNCTION, OT EVAL
Daily Note     Today's date: 2022  Patient name: Salbador Dee  : 1951  MRN: [id number]  Referring provider: Fabienne Carson DO  Dx:   Encounter Diagnosis     ICD-10-CM    1  Chronic midline low back pain with right-sided sciatica  M54 41     G89 29                   Subjective: Pt reports minimal complaints of pain in her low back 2/10 today  Objective: See treatment diary below      Assessment: Pt was able to continue with current POC this session  Increased tenderness present along R sided lumbar paraspinals and piriformis  Educated pt on DOMS and what to expect with compliance present  Added in standing hip 3 way to promote strengthening, feeling looser to end  Will continue to monitor pain levels and progress as able      Plan: Continue per plan of care  Progress treatment as tolerated  Precautions: recent hospitalization; blood clotting; Hx of pulmonary embolus;  Hx of blood clot in brain     Manuals      MFR to lumbar paraspinals and QL 15'  15' 15' 10' + piriformis                                         Neuro Re-Ed           Core Brace 10" hd 10x  10 10 10     TA+ March 20x20x  20x 20x 20x     TA + SLR 20x  20x 20x 20x     TA set +bridge  15x  15x 25x 25x     Isometric abdominal curl in supine  10x  10x    10x  Physio ball 10x physico ball                                         Ther Ex           TM 6 min  1 2 mph  6 min  1 2 mph 6 min   1 2 mph 6 min 1 2 mph     LTR 3" hd 20x  20x 20x 20x     Hip add sqz 10" hd 15x  15x 15x 20x     Supine Sciatic N  Glide R only L3 20x  L3 20x  L3 20x  L3 20x     Stand L/S ext 10" hd 10x  10x 10x  10x      Stand hip 3 way      20x                                         Ther Activity                                   Gait Training                                   Modalities                            
independent

## 2024-01-31 NOTE — DISCHARGE NOTE PROVIDER - CARE PROVIDER_API CALL
José Miguel Walden  Orthopaedic Surgery  [address], [address]  [place] [address]  Phone: [phone]  Fax: [phone]  Scheduled Appointment: 02/23/2024 10:00 AM

## 2024-01-31 NOTE — PHYSICAL THERAPY INITIAL EVALUATION ADULT - ACTIVE RANGE OF MOTION EXAMINATION, REHAB EVAL
Right hip/ankle foot WNL. Right knee flexion to 70 degrees./marguerite. upper extremity Active ROM was WNL (within normal limits)/LLE Active ROM was WNL (within normal limits)/deficits as listed below

## 2024-01-31 NOTE — PHYSICAL THERAPY INITIAL EVALUATION ADULT - NSPTDMEREC_GEN_A_CORE
[Normal] : soft, non-tender, no masses/organomegaly, normal bowel sounds
No DME needs. Pt states has a RW .

## 2024-02-01 VITALS
TEMPERATURE: 98 F | DIASTOLIC BLOOD PRESSURE: 63 MMHG | HEART RATE: 72 BPM | OXYGEN SATURATION: 95 % | RESPIRATION RATE: 16 BRPM | SYSTOLIC BLOOD PRESSURE: 100 MMHG

## 2024-02-01 LAB
ANION GAP SERPL CALC-SCNC: 6 MMOL/L — SIGNIFICANT CHANGE UP (ref 5–17)
BUN SERPL-MCNC: 14 MG/DL — SIGNIFICANT CHANGE UP (ref 7–23)
CALCIUM SERPL-MCNC: 8.4 MG/DL — SIGNIFICANT CHANGE UP (ref 8.4–10.5)
CHLORIDE SERPL-SCNC: 106 MMOL/L — SIGNIFICANT CHANGE UP (ref 96–108)
CO2 SERPL-SCNC: 28 MMOL/L — SIGNIFICANT CHANGE UP (ref 22–31)
CREAT SERPL-MCNC: 0.56 MG/DL — SIGNIFICANT CHANGE UP (ref 0.5–1.3)
EGFR: 110 ML/MIN/1.73M2 — SIGNIFICANT CHANGE UP
GLUCOSE SERPL-MCNC: 110 MG/DL — HIGH (ref 70–99)
POTASSIUM SERPL-MCNC: 3.8 MMOL/L — SIGNIFICANT CHANGE UP (ref 3.5–5.3)
POTASSIUM SERPL-SCNC: 3.8 MMOL/L — SIGNIFICANT CHANGE UP (ref 3.5–5.3)
SODIUM SERPL-SCNC: 140 MMOL/L — SIGNIFICANT CHANGE UP (ref 135–145)

## 2024-02-01 PROCEDURE — 80048 BASIC METABOLIC PNL TOTAL CA: CPT

## 2024-02-01 PROCEDURE — C1713: CPT

## 2024-02-01 PROCEDURE — 94664 DEMO&/EVAL PT USE INHALER: CPT

## 2024-02-01 PROCEDURE — 97530 THERAPEUTIC ACTIVITIES: CPT

## 2024-02-01 PROCEDURE — 99232 SBSQ HOSP IP/OBS MODERATE 35: CPT

## 2024-02-01 PROCEDURE — C1776: CPT

## 2024-02-01 PROCEDURE — 97116 GAIT TRAINING THERAPY: CPT

## 2024-02-01 PROCEDURE — 81025 URINE PREGNANCY TEST: CPT

## 2024-02-01 PROCEDURE — 97161 PT EVAL LOW COMPLEX 20 MIN: CPT

## 2024-02-01 PROCEDURE — S2900: CPT

## 2024-02-01 PROCEDURE — 97535 SELF CARE MNGMENT TRAINING: CPT

## 2024-02-01 PROCEDURE — 36415 COLL VENOUS BLD VENIPUNCTURE: CPT

## 2024-02-01 PROCEDURE — 73560 X-RAY EXAM OF KNEE 1 OR 2: CPT

## 2024-02-01 PROCEDURE — 97165 OT EVAL LOW COMPLEX 30 MIN: CPT

## 2024-02-01 PROCEDURE — 97110 THERAPEUTIC EXERCISES: CPT

## 2024-02-01 RX ORDER — SENNA PLUS 8.6 MG/1
2 TABLET ORAL
Qty: 0 | Refills: 0 | DISCHARGE
Start: 2024-02-01

## 2024-02-01 RX ORDER — CELECOXIB 200 MG/1
1 CAPSULE ORAL
Qty: 60 | Refills: 0
Start: 2024-02-01 | End: 2024-03-01

## 2024-02-01 RX ORDER — HYDROMORPHONE HYDROCHLORIDE 2 MG/ML
4 INJECTION INTRAMUSCULAR; INTRAVENOUS; SUBCUTANEOUS
Refills: 0 | Status: DISCONTINUED | OUTPATIENT
Start: 2024-02-01 | End: 2024-02-01

## 2024-02-01 RX ORDER — IBUPROFEN 200 MG
1 TABLET ORAL
Refills: 0 | DISCHARGE

## 2024-02-01 RX ORDER — OXYCODONE HYDROCHLORIDE 5 MG/1
1 TABLET ORAL
Qty: 42 | Refills: 0
Start: 2024-02-01 | End: 2024-02-07

## 2024-02-01 RX ORDER — ACETAMINOPHEN 500 MG
2 TABLET ORAL
Qty: 0 | Refills: 0 | DISCHARGE
Start: 2024-02-01

## 2024-02-01 RX ORDER — ASPIRIN/CALCIUM CARB/MAGNESIUM 324 MG
1 TABLET ORAL
Qty: 56 | Refills: 0
Start: 2024-02-01 | End: 2024-02-28

## 2024-02-01 RX ORDER — HYDROMORPHONE HYDROCHLORIDE 2 MG/ML
1 INJECTION INTRAMUSCULAR; INTRAVENOUS; SUBCUTANEOUS
Qty: 42 | Refills: 0
Start: 2024-02-01 | End: 2024-02-07

## 2024-02-01 RX ORDER — HYDROMORPHONE HYDROCHLORIDE 2 MG/ML
2 INJECTION INTRAMUSCULAR; INTRAVENOUS; SUBCUTANEOUS
Refills: 0 | Status: DISCONTINUED | OUTPATIENT
Start: 2024-02-01 | End: 2024-02-01

## 2024-02-01 RX ADMIN — PANTOPRAZOLE SODIUM 40 MILLIGRAM(S): 20 TABLET, DELAYED RELEASE ORAL at 06:20

## 2024-02-01 RX ADMIN — Medication 101.6 MILLIGRAM(S): at 06:20

## 2024-02-01 RX ADMIN — OXYCODONE HYDROCHLORIDE 5 MILLIGRAM(S): 5 TABLET ORAL at 00:45

## 2024-02-01 RX ADMIN — CELECOXIB 200 MILLIGRAM(S): 200 CAPSULE ORAL at 08:05

## 2024-02-01 RX ADMIN — OXYCODONE HYDROCHLORIDE 10 MILLIGRAM(S): 5 TABLET ORAL at 03:39

## 2024-02-01 RX ADMIN — HYDROMORPHONE HYDROCHLORIDE 2 MILLIGRAM(S): 2 INJECTION INTRAMUSCULAR; INTRAVENOUS; SUBCUTANEOUS at 11:47

## 2024-02-01 RX ADMIN — CELECOXIB 200 MILLIGRAM(S): 200 CAPSULE ORAL at 08:07

## 2024-02-01 RX ADMIN — Medication 1000 MILLIGRAM(S): at 06:25

## 2024-02-01 RX ADMIN — Medication 1000 MILLIGRAM(S): at 13:36

## 2024-02-01 RX ADMIN — OXYCODONE HYDROCHLORIDE 10 MILLIGRAM(S): 5 TABLET ORAL at 04:23

## 2024-02-01 RX ADMIN — Medication 1000 MILLIGRAM(S): at 13:46

## 2024-02-01 RX ADMIN — Medication 1000 MILLIGRAM(S): at 06:20

## 2024-02-01 RX ADMIN — OXYCODONE HYDROCHLORIDE 10 MILLIGRAM(S): 5 TABLET ORAL at 08:45

## 2024-02-01 RX ADMIN — HYDROMORPHONE HYDROCHLORIDE 2 MILLIGRAM(S): 2 INJECTION INTRAMUSCULAR; INTRAVENOUS; SUBCUTANEOUS at 16:15

## 2024-02-01 RX ADMIN — HYDROMORPHONE HYDROCHLORIDE 2 MILLIGRAM(S): 2 INJECTION INTRAMUSCULAR; INTRAVENOUS; SUBCUTANEOUS at 12:25

## 2024-02-01 RX ADMIN — OXYCODONE HYDROCHLORIDE 10 MILLIGRAM(S): 5 TABLET ORAL at 08:06

## 2024-02-01 RX ADMIN — HYDROMORPHONE HYDROCHLORIDE 2 MILLIGRAM(S): 2 INJECTION INTRAMUSCULAR; INTRAVENOUS; SUBCUTANEOUS at 15:41

## 2024-02-01 RX ADMIN — Medication 81 MILLIGRAM(S): at 06:20

## 2024-02-01 NOTE — CONSULT NOTE ADULT - SUBJECTIVE AND OBJECTIVE BOX
POD#1 R TKR     Reported 7/10 pain shortly after receiving oxycodone  Also reported nausea and feeling lightheaded          PAST MEDICAL & SURGICAL HISTORY:  Psoriasis      Arthritis      Obesity      H/O           SOCIAL HISTORY:  Residence: [ ] [facility]  [ ] [facility]  [ ] Community  [ ] Substance abuse:   [ ] Tobacco:   [ ] Alcohol use:     Allergies    No Known Allergies    Intolerances        MEDICATIONS  (STANDING):  acetaminophen     Tablet .. 1000 milliGRAM(s) Oral every 8 hours  ceFAZolin   IVPB 2000 milliGRAM(s) IV Intermittent every 8 hours  dexAMETHasone  IVPB 8 milliGRAM(s) IV Intermittent once  lactated ringers. 1000 milliLiter(s) (125 mL/Hr) IV Continuous <Continuous>  pantoprazole    Tablet 40 milliGRAM(s) Oral before breakfast  polyethylene glycol 3350 17 Gram(s) Oral at bedtime  senna 2 Tablet(s) Oral at bedtime    MEDICATIONS  (PRN):  HYDROmorphone  Injectable 0.5 milliGRAM(s) IV Push every 3 hours PRN Breakthrough pain  magnesium hydroxide Suspension 30 milliLiter(s) Oral daily PRN Constipation  ondansetron Injectable 4 milliGRAM(s) IV Push every 6 hours PRN Nausea and/or Vomiting  oxyCODONE    IR 5 milliGRAM(s) Oral every 3 hours PRN Moderate Pain (4 - 6)  oxyCODONE    IR 10 milliGRAM(s) Oral every 3 hours PRN Severe Pain (7 - 10)      FAMILY HISTORY:  No pertinent family history in first degree relatives        Vital Signs Last 24 Hrs  T(C): 36.7 (2024 11:49), Max: 36.7 (2024 06:34)  T(F): 98 (2024 11:49), Max: 98 (2024 06:34)  HR: 64 (2024 11:49) (60 - 69)  BP: 108/65 (2024 11:49) (93/60 - 137/78)  BP(mean): --  RR: 18 (2024 11:49) (12 - 18)  SpO2: 100% (2024 11:49) (98% - 100%)    Parameters below as of 2024 11:25    O2 Flow (L/min): 2      PHYSICAL EXAM:  GENERAL: NAD   HEAD:  Atraumatic, Normocephalic    EYES: EOMI, PERRLA, conjunctiva and sclera clear    NERVOUS SYSTEM:  Alert & Oriented X3, Good concentration; Moving all 4 extremities; No gross sensory deficits    CHEST/LUNG: Clear to auscultation bilaterally; No rales, rhonchi, wheezing, or rubs    HEART: Regular rate and rhythm; No murmurs, rubs, or gallops    ABDOMEN: Soft, Nontender, Nondistended; Bowel sounds present    EXTREMITIES:  2+ Peripheral Pulses, No clubbing, cyanosis, or edema    INCISION R knee dressing clean and dry, neurovascularly intact     LABS:              CAPILLARY BLOOD GLUCOSE          RADIOLOGY & ADDITIONAL STUDIES:    EKG:   Personally Reviewed:  [ ] YES     Imaging:   Personally Reviewed:  [ ] YES     Consultant(s) Notes Reviewed:      Care Discussed with Consultants/Other Providers:

## 2024-02-01 NOTE — PROGRESS NOTE ADULT - SUBJECTIVE AND OBJECTIVE BOX
Discharge medication calendar:  ASA EC 81mg q12h x 4 weeks  Omeprazole 20mg QAM x 4 weeks  Celecoxib 200mg q12h x 2-3 weeks  APAP 1000mg q8h x 2-3 weeks  Narcotic PRN  Docusate 100mg TID while taking narcotic  Miralax, Senna, or Bisacodyl PRN for treatment of constipation  Cefadroxil 500 mg q12h x 7 days   
Encounter Date: 2023       History     Chief Complaint   Patient presents with    Chest Pain     CP, SOB that started yesterday HA x4days      41-year-old female states she had the insidious onset of a diffuse frontal headache starting on Monday.  She states it progressively worsened and currently is a 9/10 in intensity.  Is worse with lights and noises.  It improved some with ibuprofen.  She has no fevers chills sweats visual changes weakness numbness nor tingling.  No neck pain.  No trauma.  She states she has had headaches similar to the headache she is having today in the past.  She also states she has had constant left-sided chest pain since yesterday.  There is no radiation of the pain.  She has no shortness of breath.  The pain is worse when she leans forward and there is no exertional component.  She had some relief with ibuprofen.  She states she vomited 10 times yesterday and has had 2 diarrhea bowel movements.    Review of patient's allergies indicates:   Allergen Reactions    Cortisone Hives     No past medical history on file.  Past Surgical History:   Procedure Laterality Date     SECTION      TUBAL LIGATION       Family History   Problem Relation Age of Onset    Migraines Mother      Social History     Tobacco Use    Smoking status: Every Day     Types: Cigarettes    Smokeless tobacco: Never    Tobacco comments:     2 cigarettes per day   Substance Use Topics    Alcohol use: Yes     Comment: wine every other weekend    Drug use: Yes     Frequency: 7.0 times per week     Types: Marijuana     Comment: daily     Review of Systems   Constitutional:  Negative for fever.   HENT:  Negative for sore throat.    Respiratory:  Negative for shortness of breath.    Cardiovascular:  Positive for chest pain.   Gastrointestinal:  Positive for nausea.   Genitourinary:  Negative for dysuria.   Musculoskeletal:  Negative for back pain.   Skin:  Negative for rash.   Neurological:  Negative for weakness. 
Post Op     ROULA DE LA O      53y        Female                                                                                                                 T(C): 36.6 (02-01-24 @ 07:42), Max: 36.7 (01-31-24 @ 11:49)  HR: 72 (02-01-24 @ 07:42) (60 - 72)  BP: 100/63 (02-01-24 @ 07:42) (93/60 - 108/65)  RR: 16 (02-01-24 @ 07:42) (12 - 18)  SpO2: 95% (02-01-24 @ 07:42) (95% - 100%)  Wt(kg): --    S/P total knee  Patient denies shortness of breath, chest pain, dyspnea, No complaints  Pain is3 /10    Physical Exam    Extremity: Bilaterally:  No holmon                                           No Cord                                          PAS on                                          Neurovascular intact                                          Motor intact EHL/FHL                                          Sensation intact                                          Pulses intact DP/PT                                         Calves Soft                                         Dressing Clean / Dry / Intact meplix                                         Capillary refill with 5 seconds                A/P  -- S/P total knee     -  Medicine To Follow   - DVT prophylaxis PAS asa 81mg po bid  duricef 500mg po bid  - PT & OT   - Analagesia  - Incentive Spirometry  - Discharge Planning  - Safety Precautions  -  CBC , BMP daily    
Post Op Day # 0    SUBJECTIVE    52yo Female status post right TKR .   Patient is alert and comfortable.    Pain is controlled with current pain regimen.  Has walked with PT to the bathroom.  Denies nausea, vomiting, chest pain, shortness of breath, abdominal pain or fever.   No new complaints.    OBJECTIVE    Vital Signs Last 24 Hrs  T(C): 36.7 (31 Jan 2024 11:49), Max: 36.7 (31 Jan 2024 06:34)  T(F): 98 (31 Jan 2024 11:49), Max: 98 (31 Jan 2024 06:34)  HR: 64 (31 Jan 2024 11:49) (60 - 69)  BP: 108/65 (31 Jan 2024 11:49) (93/60 - 137/78)  BP(mean): --  RR: 18 (31 Jan 2024 11:49) (12 - 18)  SpO2: 100% (31 Jan 2024 11:49) (98% - 100%)    Parameters below as of 31 Jan 2024 11:25    O2 Flow (L/min): 2    I&O's Summary    31 Jan 2024 07:01  -  31 Jan 2024 12:36  --------------------------------------------------------  IN: 1300 mL / OUT: 50 mL / NET: 1250 mL      PHYSICAL EXAM    Right knee cryocuff and ACE are in place.   The calves are supple/nontender.   Sensation to light touch is grossly intact distally.   Motor function distally is intact.   No foot drop.   (2+) dorsalis pedis pulse. Capillary refill is less than 2 seconds. No cyanosis.    ASSESSMENT AND PLAN  - Orthopedically stable  - DVT prophylaxis: PAS + Aspirin 81 mg Q 12h   - Continue physical therapy and occupational therapy  - Weight bearing as tolerated of the right lower extremity with assistance of a walker  - Incentive spirometry encouraged  - Pain control as clinically indicated  - Disposition:  Home likely tomorrow       
  Hematological:  Does not bruise/bleed easily.   All other systems reviewed and are negative.    Physical Exam     Initial Vitals [07/22/23 0939]   BP Pulse Resp Temp SpO2   120/66 90 19 98.6 °F (37 °C) 99 %      MAP       --         Physical Exam    Vitals reviewed.  Constitutional: She appears well-developed. No distress.   She is very well-appearing showing no signs of being in pain   HENT:   Mouth/Throat: Oropharynx is clear and moist.   Her forehead is tender which reproduces her pain.  There is no swelling redness nor other abnormality   Eyes: Conjunctivae are normal.   Neck:   Neck is supple with a full range of motion.  No meningismus   Cardiovascular:  Normal rate, regular rhythm and normal heart sounds.     Exam reveals no gallop and no friction rub.       No murmur heard.  Pulmonary/Chest: Breath sounds normal. No respiratory distress. She has no wheezes. She has no rhonchi. She has no rales. She exhibits tenderness.   Reproducible chest pain with palpation of the left upper chest.  There is no erythema crepitus swelling warmth nor other abnormality   Abdominal: Abdomen is soft. Bowel sounds are normal. She exhibits no distension. There is no abdominal tenderness. There is no guarding.   Musculoskeletal:         General: No edema.     Lymphadenopathy:     She has no cervical adenopathy.   Neurological: She is alert and oriented to person, place, and time. No cranial nerve deficit. She displays a negative Romberg sign. Coordination and gait normal. GCS eye subscore is 4. GCS verbal subscore is 5. GCS motor subscore is 6.   Skin: Skin is warm.   Psychiatric: She has a normal mood and affect.       ED Course   Procedures  Labs Reviewed   COMPREHENSIVE METABOLIC PANEL - Abnormal; Notable for the following components:       Result Value    Glucose Level 72 (*)     All other components within normal limits   CBC WITH DIFFERENTIAL - Abnormal; Notable for the following components:    RBC 4.08 (*)     .7 
Orthopedic Progress Note     Interval History:  No acute events overnight, pain is well controlled.  Patient denies any chest pain, SOB, N/V, fevers/chills.    Exam:  T(C): 36.6 (02-01-24 @ 07:42), Max: 36.7 (01-31-24 @ 11:49)  HR: 72 (02-01-24 @ 07:42) (60 - 72)  BP: 100/63 (02-01-24 @ 07:42) (93/60 - 108/65)  RR: 16 (02-01-24 @ 07:42) (12 - 18)  SpO2: 95% (02-01-24 @ 07:42) (95% - 100%)  Wt(kg): --I&O's Summary    31 Jan 2024 07:01  -  01 Feb 2024 07:00  --------------------------------------------------------  IN: 1300 mL / OUT: 850 mL / NET: 450 mL        Lying in bed in no apparent distress  Unlabored respirations    EXT:   Dressing clean, dry and intact.   Compression stocking in place.   Sensation is intact in the superficial peroneal, deep peroneal, tibial, sural and saphenous nerve distributions  Able to dorsiflex and plantarflex ankle. Wiggles toes  Foot warm and well perfused  Sequential compression device on            Labs:           Assessment/ Plan: ROULA DE LA O is postoperative day #1 s/p right total knee replacement. Overall doing well  - please ensure ice over operative site while in bed or chair.   - PT/ OT for rehabilitation. WBAT with ROM activities as tolerated  - multimodal pain regimen.   - IV surgical prophylactic antibiotics x 24 hours. ***Cefadroxil 500 BID x 7 days for high risk prophylaxis***  - chemical DVT prophylaxis per protocol  - SCDs while in bed. Out of bed to chair   - thigh high compression stockings. Keep on operative leg until postoperative appointment. Can remove compression stocking to shower/ bathe.  - incentive spirometry  - disposition planning. Call [phone] to confirm or change postoperative appointment.  - page orthopedics team with questions or concerns.
(*)     MCH 35.0 (*)     All other components within normal limits   TROPONIN I - Normal   CBC W/ AUTO DIFFERENTIAL    Narrative:     The following orders were created for panel order CBC auto differential.  Procedure                               Abnormality         Status                     ---------                               -----------         ------                     CBC with Differential[[id number]]        Abnormal            Final result                 Please view results for these tests on the individual orders.     EKG Readings: (Independently Interpreted)   Initial Reading: No STEMI. Rhythm: Normal Sinus Rhythm. Ectopy: No Ectopy. Conduction: Normal. ST Segments: Normal ST Segments. Axis: Normal. Clinical Impression: Normal Sinus Rhythm   Medical Decision Making  Amount and/or Complexity of Data Reviewed  External Data Reviewed: notes.  Labs: ordered. Decision-making details documented in ED Course.  Radiology: ordered and independent interpretation performed.     Details: naf  ECG/medicine tests: ordered and independent interpretation performed. Decision-making details documented in ED Course.         Imaging Results              X-Ray Chest 1 View (Final result)  Result time 07/22/23 10:20:01      Final result by Rehan Gil MD (07/22/23 10:20:01)                   Impression:      No acute cardiopulmonary process.      Electronically signed by: Rehan Gil  Date:    07/22/2023  Time:    10:20               Narrative:    EXAMINATION:  XR CHEST 1 VIEW    CLINICAL HISTORY:  Chest pain, unspecified    TECHNIQUE:  Single view of the chest    COMPARISON:  08/30/2022    FINDINGS:  No focal opacification, pleural effusion, or pneumothorax.    The cardiomediastinal silhouette is within normal limits.    No acute osseous abnormality.                                       Medications   ketorolac injection 30 mg (30 mg Intravenous Given 7/22/23 1016)   sodium chloride 0.9% bolus 1,000 mL 1,000 mL (0 
mLs Intravenous Stopped 7/22/23 1117)     Medical Decision Making:   Differential Diagnosis:   MI, acs, pe, ptx, gastroenteritis, dehydration, migraine, cerebral aneurysm  Clinical Tests:   Lab Tests: Reviewed and Ordered  Radiological Study: Reviewed and Ordered           ED Course as of 07/22/23 1428   Sat Jul 22, 2023   1200 Glucose(!): 72 [RL]   1200 Troponin I: <0.010 [RL]      ED Course User Index  [RL] Beto London Jr., MD                 Clinical Impression:   Final diagnoses:  [R07.89] Chest wall pain (Primary)  [G44.211] Intractable episodic tension-type headache  [K52.9] Gastroenteritis        ED Disposition Condition    Discharge Stable          ED Prescriptions       Medication Sig Dispense Start Date End Date Auth. Provider    ketorolac (TORADOL) 10 mg tablet Take 1 tablet (10 mg total) by mouth every 6 (six) hours. for 5 days 20 tablet 7/22/2023 7/27/2023 Beto London Jr., MD    ondansetron (ZOFRAN-ODT) 4 MG TbDL Take 2 tablets (8 mg total) by mouth 2 (two) times daily. 15 tablet 7/22/2023 -- Beto London Jr., MD          Follow-up Information       Follow up With Specialties Details Why Contact Info    [facility] In 2 days  [address] [facility] [address] [phone]               Beto London Jr., MD  07/22/23 1425

## 2024-02-01 NOTE — CONSULT NOTE ADULT - ASSESSMENT
53F with no known medical history s/p R TKR for OA. Hospital course notable for nausea and dizziness    POD#1 R TKR   PT/OT evals limited by nausea and dizziness  Multimodal analgesia   Bowel regimen   VTE ppx    Incentive spirometry    Reasonable for dc later today if H/H stable and pt feels better     Lightheadedness   Likely due to periop blood loss   Blood pressure 100s systolic, non tachycardic  Obtain H/H and consider 1L NS resuscitation    Nausea likely related to opioids and constipation  Reduce opiate dose if tolerated   Bowel regimen, antiemetic, symptomatic management      
53F with no known medical history s/p R TKR for OA     POD#0 R TKR   Multimodal analgesia   Bowel regimen   PT/OT evaluations   VTE ppx    Incentive spirometry    Obtain basic labs

## 2024-02-20 ENCOUNTER — APPOINTMENT (OUTPATIENT)
Dept: ORTHOPEDIC SURGERY | Facility: CLINIC | Age: 54
End: 2024-02-20
Payer: MEDICAID

## 2024-02-20 VITALS
SYSTOLIC BLOOD PRESSURE: 132 MMHG | DIASTOLIC BLOOD PRESSURE: 89 MMHG | HEIGHT: 64 IN | BODY MASS INDEX: 36.7 KG/M2 | WEIGHT: 215 LBS | HEART RATE: 77 BPM

## 2024-02-20 DIAGNOSIS — M25.569 PAIN IN UNSPECIFIED KNEE: ICD-10-CM

## 2024-02-20 PROCEDURE — 99024 POSTOP FOLLOW-UP VISIT: CPT

## 2024-02-20 PROCEDURE — 73562 X-RAY EXAM OF KNEE 3: CPT | Mod: RT

## 2024-02-20 RX ORDER — TIZANIDINE 4 MG/1
4 TABLET ORAL EVERY 8 HOURS
Qty: 30 | Refills: 0 | Status: ACTIVE | COMMUNITY
Start: 2024-02-20 | End: 1900-01-01

## 2024-02-20 NOTE — PHYSICAL EXAM
[de-identified] : steady gait w cane dressing removed and incision healing well, no erythema/fluctuance/purulence/streaking - steristrips placed  3-95 knee rom 5/5 ip q ehl at  SILT L3-S1 stable to a/p, v/v stress 2+ dp wwp bcr [de-identified] : The following radiographs were ordered and read by me during this patient's visit. I reviewed each radiograph in detail with the patient and discussed the findings as highlighted below.   3 views of the R knee were obtained today that show no fracture, or dislocation around uncemented TKA in excellent alignment. There are no degenerative changes seen. There is no malalignment. No obvious osseous abnormality. Otherwise unremarkable.

## 2024-02-20 NOTE — DISCUSSION/SUMMARY
[de-identified] : 53yF doing well 3w sp TKA. The patient was extensively counseled on treatment options including but not limited to observation, rest/activity modification, bracing, anti-inflammatory medications, physical therapy, injections, and surgery.  The natural history of the disease was thoroughly explained.  We discussed that the majority of the time, this condition can be initially treated conservatively. The patient will proceed with: -PT rx -tizanidine rx for pre PT as will wean opiate -will f/u w Dr. Walden in 4-6w  I have personally obtained the history, reviewed the ROS as noted, and performed the physical examination today.  The patient and I discussed the assessment and options and developed the plan.  All questions were answered and the patient stated their understanding of the treatment plan and appreciation of the visit.  My cumulative time spent on this patient's visit included: Preparation for the visit, review of the medical records, review of pertinent diagnostic studies, examination and counseling of the patient on the above diagnosis, treatment plan and prognosis, orders of diagnostic tests, medications and/or appropriate procedures and documentation in the medical records of today's visit.   Rogers Olson MD

## 2024-02-20 NOTE — HISTORY OF PRESENT ILLNESS
[de-identified] : 53yF 3w sp TKA with Dr. Walden.  Pt returns after surgery stating the pain has gradually decreased each day.  Pt denies f/c, cp/sob, numbness/paresthesias, has followed postop instructions regarding rom and weight bearing status, has weaned prescription pain meds.  No issues with the dressing or wound.  Feels some posteromedial knee pain but improving, notes pain is better than anticipated.  Completed home PT.

## 2024-04-05 ENCOUNTER — APPOINTMENT (OUTPATIENT)
Dept: ORTHOPEDIC SURGERY | Facility: CLINIC | Age: 54
End: 2024-04-05
Payer: MEDICAID

## 2024-04-05 DIAGNOSIS — Z96.651 PRESENCE OF RIGHT ARTIFICIAL KNEE JOINT: ICD-10-CM

## 2024-04-05 PROCEDURE — 99213 OFFICE O/P EST LOW 20 MIN: CPT | Mod: 24,25

## 2024-04-05 PROCEDURE — 20610 DRAIN/INJ JOINT/BURSA W/O US: CPT | Mod: 79,LT

## 2024-04-05 PROCEDURE — 73562 X-RAY EXAM OF KNEE 3: CPT | Mod: LT,RT

## 2024-04-05 RX ORDER — METHYLPRED ACET/NACL,ISO-OS/PF 40 MG/ML
40 VIAL (ML) INJECTION
Refills: 0 | Status: COMPLETED | OUTPATIENT
Start: 2024-04-05

## 2024-04-05 RX ORDER — LIDOCAINE HYDROCHLORIDE 10 MG/ML
1 INJECTION, SOLUTION INFILTRATION; PERINEURAL
Refills: 0 | Status: COMPLETED | OUTPATIENT
Start: 2024-04-05

## 2024-04-05 RX ORDER — IBUPROFEN 800 MG/1
800 TABLET, FILM COATED ORAL
Qty: 30 | Refills: 1 | Status: ACTIVE | COMMUNITY
Start: 2024-04-05 | End: 1900-01-01

## 2024-04-05 RX ADMIN — LIDOCAINE HYDROCHLORIDE 4 %: 10 INJECTION, SOLUTION EPIDURAL; INFILTRATION; INTRACAUDAL; PERINEURAL at 00:00

## 2024-04-05 RX ADMIN — METHYLPREDNISOLONE ACETATE 1 MG/ML: 40 INJECTION, SUSPENSION INTRA-ARTICULAR; INTRALESIONAL; INTRAMUSCULAR; SOFT TISSUE at 00:00

## 2024-04-05 NOTE — PROCEDURE
[de-identified] : Injection: Left knee joint. Indication: Osteoarthritis.   A discussion was had with the patient regarding this procedure and all questions were answered. All risks, benefits and alternatives were discussed. These included but were not limited to bleeding, infection, allergic reaction and reaccumulation of fluid. A timeout was done to ensure correct side and patient agreed to the procedure.  A Native suyapa was created on the skin utilizing a plastic needle cap to suyapa the anticipated point of entry. Alcohol was used to clean the skin, and chloraprep was used to sterilize and prep the area in the lateral joint line aspect of the knee. Ethyl chloride spray was then used as a topical anesthetic. A 22-gauge needle was used to inject 4cc 1% lidocaine without epinephrine,  and 1cc of 40mg/ml methylprednisolone into the knee. A sterile bandage was then applied. The patient tolerated the procedure well.

## 2024-04-05 NOTE — HISTORY OF PRESENT ILLNESS
[de-identified] : ROULA DE LA O  is an 53 year-old female presents today status post right total knee arthroplasty on 1/31/2024 for followup. The patient is living at home. The patient has maintained weight bearing as tolerated. The patient is ambulating with no assistive device and working with physical therapy. The patient has weaned off all narcotic pain medicine. The patient is progressing well and is happy with the progress.  She is having severe left knee pain and has known left knee arthritis.   No fever, chills, or signs of infection. Today, patient does not state any other associated signs or complaints outside of those described. The patient presents for postoperative followup.

## 2024-04-05 NOTE — PHYSICAL EXAM
[de-identified] : General Appearance / Station: Well developed, well nourished, in no acute distress Orientation: Oriented to person, place, and time Gait & Station: Ambulates with assistive device Neurologic: Normal leg sensation Cardiovascular: Warm extremity Lymphatics: No lymphedema   OPERATIVE RIGHT KNEE Skin: incision clean, dry and intact. Well healing. No erythema, warmth or tenderness. Range of motion: 0-120 Strength: Within Normal Limits. Able to straight leg raise                                                                  Neurovascular Exam: Able to wiggle toes and dorsiflex/ plantarflex ankle. Foot warm, well perfused  LEFT HIP: Range of motion: Painless  internal and external rotation of the hip. Strength: Within Normal Limits  Palpation: Nontender  at greater trochanter. Nontender  at SI joint Stinchfield: Negative  FADIR: Negative  JOSUE: Negative   SYMPTOMATIC LEFT KNEE: Alignment: VARUS  Skin: normal Effusion: none . Quadriceps: normal . Range of motion: symmetrical but painful . PF crepitus: 1+. PF apprehension: none . Patella / Patella Tendon: nontender . Lachman's: negative  Valgus @ 30: negative. Varus @ 30: negative. Posterior drawer: negative. Palpation: TENDER AT MEDIAL JOINT LINE. Meniscus signs: MEDIAL JOINT LINE   [de-identified] : Imaging: Three views of the right knee show satisfactory placement of a right cementeless total knee arthroplasty. There are no changes in alignment of hardware and no signs of radiographic failure compared to previous radiographs.  Imaging: Standing 3 views of the left knee show left knee severe arthritis worse in the medial compartment with loss of joint space, subchondral sclerosis, marginal osteophyte formations, and subchondral cyst formation. There are no signs of fracture. There is no  knee effusion. The soft tissues appear unremarkable

## 2024-04-05 NOTE — DISCUSSION/SUMMARY
[Medication Risks Reviewed] : Medication risks reviewed [de-identified] : Now s/p right TKA on 1/31/2024 for follow-up. Overall doing well.  She has returned to partial work working however is limited by her left knee arthritis.  She is hoping to postpone addressing the left knee operatively for at least a year.  She was given injection today.  She was also given a new prescription for ibuprofen 800 as it does seem to work well for her.   At this point we have suggested continued exercises and return to normal activity. Appropriate instructions regarding further care, restrictions, and further recovery has been given.    We have also counseled the patient to avoid any dental procedures for the first three months postoperatively.. We remain available for any further questions and concerns and instructed patient that we would like to see them if any concerns for infection including fevers, redness, or increased swelling.

## 2024-04-19 ENCOUNTER — NON-APPOINTMENT (OUTPATIENT)
Age: 54
End: 2024-04-19

## 2024-05-24 ENCOUNTER — APPOINTMENT (OUTPATIENT)
Dept: ORTHOPEDIC SURGERY | Facility: CLINIC | Age: 54
End: 2024-05-24
Payer: MEDICAID

## 2024-05-24 PROCEDURE — 99213 OFFICE O/P EST LOW 20 MIN: CPT

## 2024-05-24 NOTE — PHYSICAL EXAM
[de-identified] : General Appearance / Station: Well developed, well nourished, in no acute distress Orientation: Oriented to person, place, and time Gait & Station: Ambulates with assistive device Neurologic: Normal leg sensation Cardiovascular: Warm extremity Lymphatics: No lymphedema   OPERATIVE RIGHT KNEE Skin: incision clean, dry and intact. Well healing. No erythema, warmth or tenderness. Range of motion: 0-120 Strength: Within Normal Limits. Able to straight leg raise                                                                  Neurovascular Exam: Able to wiggle toes and dorsiflex/ plantarflex ankle. Foot warm, well perfused  LEFT HIP: Range of motion: Painless  internal and external rotation of the hip. Strength: Within Normal Limits  Palpation: Nontender  at greater trochanter. Nontender  at SI joint Stinchfield: Negative  FADIR: Negative  JOSUE: Negative   SYMPTOMATIC LEFT KNEE: Alignment: VARUS  Skin: normal Effusion: none . Quadriceps: normal . Range of motion: symmetrical but painful . PF crepitus: 1+. PF apprehension: none . Patella / Patella Tendon: nontender . Lachman's: negative  Valgus @ 30: negative. Varus @ 30: negative. Posterior drawer: negative. Palpation: TENDER AT MEDIAL JOINT LINE. Meniscus signs: MEDIAL JOINT LINE

## 2024-05-24 NOTE — HISTORY OF PRESENT ILLNESS
[de-identified] : ROULA DE LA O  is an 53 year-old female presents today status post right total knee arthroplasty on 1/31/2024 for followup.  Her right knee replacement is doing well however her left knee is doing much worse.  She had injection on 4/5/2024 without much relief   No fever, chills, or signs of infection. Today, patient does not state any other associated signs or complaints outside of those described. The patient presents for postoperative followup.

## 2024-05-24 NOTE — DISCUSSION/SUMMARY
[de-identified] : I discussed the options with her.  She would like to hold off on surgery at this time.  She previously tried a medial  brace for the opposite knee which helped prior to surgery and I will prescribe her a medial  brace for the left knee.  She will follow-up for repeat injection as necessary.  All questions were answered she is in agreement the plan.

## 2024-06-26 ENCOUNTER — APPOINTMENT (OUTPATIENT)
Dept: INTERNAL MEDICINE | Facility: CLINIC | Age: 54
End: 2024-06-26

## 2024-06-29 ENCOUNTER — NON-APPOINTMENT (OUTPATIENT)
Age: 54
End: 2024-06-29

## 2025-04-02 ENCOUNTER — APPOINTMENT (OUTPATIENT)
Dept: INTERNAL MEDICINE | Facility: CLINIC | Age: 55
End: 2025-04-02
Payer: MEDICAID

## 2025-04-02 VITALS
DIASTOLIC BLOOD PRESSURE: 78 MMHG | HEIGHT: 64 IN | BODY MASS INDEX: 37.9 KG/M2 | OXYGEN SATURATION: 98 % | SYSTOLIC BLOOD PRESSURE: 128 MMHG | TEMPERATURE: 98.3 F | RESPIRATION RATE: 16 BRPM | HEART RATE: 82 BPM | WEIGHT: 222 LBS

## 2025-04-02 DIAGNOSIS — Z00.00 ENCOUNTER FOR GENERAL ADULT MEDICAL EXAMINATION W/OUT ABNORMAL FINDINGS: ICD-10-CM

## 2025-04-02 DIAGNOSIS — Z82.0 FAMILY HISTORY OF EPILEPSY AND OTHER DISEASES OF THE NERVOUS SYSTEM: ICD-10-CM

## 2025-04-02 PROCEDURE — 99396 PREV VISIT EST AGE 40-64: CPT | Mod: 25

## 2025-04-03 LAB
25(OH)D3 SERPL-MCNC: 7.9 NG/ML
ALBUMIN SERPL ELPH-MCNC: 4.4 G/DL
ALP BLD-CCNC: 114 U/L
ALT SERPL-CCNC: 13 U/L
ANION GAP SERPL CALC-SCNC: 11 MMOL/L
APPEARANCE: CLEAR
AST SERPL-CCNC: 14 U/L
BASOPHILS # BLD AUTO: 0.08 K/UL
BASOPHILS NFR BLD AUTO: 1 %
BILIRUB SERPL-MCNC: 0.3 MG/DL
BILIRUBIN URINE: NEGATIVE
BLOOD URINE: NEGATIVE
BUN SERPL-MCNC: 16 MG/DL
CALCIUM SERPL-MCNC: 9.3 MG/DL
CHLORIDE SERPL-SCNC: 103 MMOL/L
CHOLEST SERPL-MCNC: 185 MG/DL
CO2 SERPL-SCNC: 28 MMOL/L
COLOR: YELLOW
CREAT SERPL-MCNC: 0.62 MG/DL
EGFRCR SERPLBLD CKD-EPI 2021: 106 ML/MIN/1.73M2
EOSINOPHIL # BLD AUTO: 0.14 K/UL
EOSINOPHIL NFR BLD AUTO: 1.7 %
ESTIMATED AVERAGE GLUCOSE: 120 MG/DL
FOLATE SERPL-MCNC: 8 NG/ML
GLUCOSE QUALITATIVE U: NEGATIVE MG/DL
GLUCOSE SERPL-MCNC: 107 MG/DL
HBA1C MFR BLD HPLC: 5.8 %
HCT VFR BLD CALC: 45.2 %
HDLC SERPL-MCNC: 70 MG/DL
HGB BLD-MCNC: 14.6 G/DL
IMM GRANULOCYTES NFR BLD AUTO: 0.5 %
KETONES URINE: NEGATIVE MG/DL
LDLC SERPL-MCNC: 106 MG/DL
LEUKOCYTE ESTERASE URINE: NEGATIVE
LYMPHOCYTES # BLD AUTO: 2.14 K/UL
LYMPHOCYTES NFR BLD AUTO: 25.8 %
MAGNESIUM SERPL-MCNC: 2.2 MG/DL
MAN DIFF?: NORMAL
MCHC RBC-ENTMCNC: 27.5 PG
MCHC RBC-ENTMCNC: 32.3 G/DL
MCV RBC AUTO: 85.1 FL
MONOCYTES # BLD AUTO: 0.69 K/UL
MONOCYTES NFR BLD AUTO: 8.3 %
NEUTROPHILS # BLD AUTO: 5.22 K/UL
NEUTROPHILS NFR BLD AUTO: 62.7 %
NITRITE URINE: NEGATIVE
NONHDLC SERPL-MCNC: 115 MG/DL
PH URINE: 6
PLATELET # BLD AUTO: 300 K/UL
POTASSIUM SERPL-SCNC: 4.8 MMOL/L
PROT SERPL-MCNC: 6.9 G/DL
PROTEIN URINE: NEGATIVE MG/DL
RBC # BLD: 5.31 M/UL
RBC # FLD: 14.4 %
SODIUM SERPL-SCNC: 141 MMOL/L
SPECIFIC GRAVITY URINE: 1.01
TRIGL SERPL-MCNC: 48 MG/DL
TSH SERPL-ACNC: 0.56 UIU/ML
UROBILINOGEN URINE: 0.2 MG/DL
VIT B12 SERPL-MCNC: 486 PG/ML
WBC # FLD AUTO: 8.31 K/UL

## 2025-04-29 NOTE — OCCUPATIONAL THERAPY INITIAL EVALUATION ADULT - HEALTH SCREEN CRITERIA
.Monitor blood pressure at home, keep readings  Take all medication as prescribed   Monitor salt, caffeine intake  Start topiramate for weight loss along with healthy diet and exercise  Return in 2 weeks   yes

## 2025-05-13 DIAGNOSIS — E66.3 OVERWEIGHT: ICD-10-CM

## 2025-05-13 RX ORDER — NALTREXONE HYDROCHLORIDE AND BUPROPION HYDROCHLORIDE 8; 90 MG/1; MG/1
8-90 TABLET, EXTENDED RELEASE ORAL
Qty: 120 | Refills: 0 | Status: ACTIVE | COMMUNITY
Start: 2025-05-13 | End: 1900-01-01

## 2025-05-14 ENCOUNTER — RESULT REVIEW (OUTPATIENT)
Age: 55
End: 2025-05-14

## 2025-05-14 ENCOUNTER — APPOINTMENT (OUTPATIENT)
Dept: MAMMOGRAPHY | Facility: CLINIC | Age: 55
End: 2025-05-14
Payer: MEDICAID

## 2025-05-14 PROCEDURE — 77063 BREAST TOMOSYNTHESIS BI: CPT

## 2025-05-14 PROCEDURE — 77067 SCR MAMMO BI INCL CAD: CPT

## 2025-06-25 ENCOUNTER — APPOINTMENT (OUTPATIENT)
Dept: INTERNAL MEDICINE | Facility: CLINIC | Age: 55
End: 2025-06-25

## 2025-08-06 ENCOUNTER — APPOINTMENT (OUTPATIENT)
Dept: INTERNAL MEDICINE | Facility: CLINIC | Age: 55
End: 2025-08-06

## 2025-08-07 ENCOUNTER — RX RENEWAL (OUTPATIENT)
Age: 55
End: 2025-08-07

## (undated) DEVICE — MAKO BLADE STANDARD

## (undated) DEVICE — PACK TOTAL KNEE

## (undated) DEVICE — SOL IRR POUR H2O 1500ML

## (undated) DEVICE — CRYO/CUFF GRAVITY COOLER KNEE LARGE

## (undated) DEVICE — DRAPE STERI-DRAPE INCISE 23X23"

## (undated) DEVICE — MARKING PEN W RULER

## (undated) DEVICE — WARMING BLANKET UPPER ADULT

## (undated) DEVICE — DRSG SILVERLON ISLAND 4X6" 2X4" PAD

## (undated) DEVICE — BAG SPONGE COUNTER EZ

## (undated) DEVICE — DRAIN JACKSON PRATT 3 SPRING RESERVOIR W 10FR PVC DRAIN

## (undated) DEVICE — DRAPE INSTRUMENT POUCH 6.75" X 11"

## (undated) DEVICE — DRAPE SPLIT SHEET 77" X 120"

## (undated) DEVICE — GLV 8 PROTEXIS (BLUE)

## (undated) DEVICE — MAKO VIZADISC KNEE TRACKING KIT

## (undated) DEVICE — ELCTR STRYKER NEPTUNE SMOKE EVACUATION PENCIL (GREEN)

## (undated) DEVICE — DRSG WEBRIL 6"

## (undated) DEVICE — DRAPE IOBAN 23" X 23"

## (undated) DEVICE — VISITEC 4X4

## (undated) DEVICE — HOOD T5 PEELAWAY

## (undated) DEVICE — SUT POLYSORB 2-0 30" GS-11 UNDYED

## (undated) DEVICE — SOL IRR POUR NS 0.9% 500ML

## (undated) DEVICE — SOLIDIFIER CANN EXPRESS 3K

## (undated) DEVICE — HANDPIECE INTERPULSE W MULTI TIP

## (undated) DEVICE — SUT POLYSORB 1 27" GS-12 UNDYED

## (undated) DEVICE — SUT MONOSOF 3-0 30" C-16

## (undated) DEVICE — SYR LUER LOK 20CC

## (undated) DEVICE — HOOD FLYTE STRYKER HELMET SHIELD

## (undated) DEVICE — TOURNIQUET CUFF 34" DUAL PORT W PLC

## (undated) DEVICE — SPECIMEN CONTAINER PET

## (undated) DEVICE — POSITIONER STIRRUP STRAP W SLIP RING 19X3.5"

## (undated) DEVICE — MAKO BLADE NARROW

## (undated) DEVICE — DRAPE 3/4 SHEET 52X76"

## (undated) DEVICE — GOWN TRIMAX XXL

## (undated) DEVICE — DRSG SILVERLON ISLAND 4X14" 2X12" PAD

## (undated) DEVICE — VENODYNE/SCD SLEEVE CALF MEDIUM

## (undated) DEVICE — BLADE SURGICAL #10 STAINLESS

## (undated) DEVICE — MAKO DRAPE KIT

## (undated) DEVICE — SOL IRR BAG NS 0.9% 3000ML

## (undated) DEVICE — POSITIONER STRAP ARMBOARD VELCRO TS-30

## (undated) DEVICE — BLADE SURGICAL #15 CARBON

## (undated) DEVICE — GLV 8 PROTEXIS (WHITE)

## (undated) DEVICE — MAKO CHECKPOINT KIT FEMORAL / TIBIAL